# Patient Record
Sex: FEMALE | Race: WHITE | NOT HISPANIC OR LATINO | Employment: FULL TIME | URBAN - METROPOLITAN AREA
[De-identification: names, ages, dates, MRNs, and addresses within clinical notes are randomized per-mention and may not be internally consistent; named-entity substitution may affect disease eponyms.]

---

## 2017-05-10 ENCOUNTER — HISTORICAL (OUTPATIENT)
Dept: LAB | Facility: HOSPITAL | Age: 37
End: 2017-05-10

## 2017-07-25 ENCOUNTER — HISTORICAL (OUTPATIENT)
Dept: INTENSIVE CARE | Facility: HOSPITAL | Age: 37
End: 2017-07-25

## 2019-12-16 ENCOUNTER — HISTORICAL (OUTPATIENT)
Dept: ADMINISTRATIVE | Facility: HOSPITAL | Age: 39
End: 2019-12-16

## 2020-02-03 ENCOUNTER — HISTORICAL (OUTPATIENT)
Dept: ADMINISTRATIVE | Facility: HOSPITAL | Age: 40
End: 2020-02-03

## 2021-08-23 ENCOUNTER — HISTORICAL (OUTPATIENT)
Dept: RADIOLOGY | Facility: HOSPITAL | Age: 41
End: 2021-08-23

## 2021-09-01 ENCOUNTER — HISTORICAL (OUTPATIENT)
Dept: RADIOLOGY | Facility: HOSPITAL | Age: 41
End: 2021-09-01

## 2021-09-15 ENCOUNTER — HISTORICAL (OUTPATIENT)
Dept: ADMINISTRATIVE | Facility: HOSPITAL | Age: 41
End: 2021-09-15

## 2021-09-15 LAB
BASOPHILS # BLD AUTO: 0 X10E3/UL (ref 0–0.2)
BASOPHILS NFR BLD AUTO: 0 %
EOSINOPHIL # BLD AUTO: 0 X10E3/UL (ref 0–0.4)
EOSINOPHIL NFR BLD AUTO: 0 %
ERYTHROCYTE [DISTWIDTH] IN BLOOD BY AUTOMATED COUNT: 15.5 % (ref 11.7–15.4)
HCT VFR BLD AUTO: 31 % (ref 34–46.6)
HGB BLD-MCNC: 9.7 G/DL (ref 11.1–15.9)
LYMPHOCYTES # BLD AUTO: 1.1 X10E3/UL (ref 0.7–3.1)
LYMPHOCYTES NFR BLD AUTO: 9 %
MCH RBC QN AUTO: 27.6 PG (ref 26.6–33)
MCHC RBC AUTO-ENTMCNC: 31.3 G/DL (ref 31.5–35.7)
MCV RBC AUTO: 88 FL (ref 79–97)
MONOCYTES # BLD AUTO: 0.6 X10E3/UL (ref 0.1–0.9)
MONOCYTES NFR BLD AUTO: 5 %
NEUTROPHILS # BLD AUTO: 9.5 X10E3/UL (ref 1.4–7)
NEUTROPHILS NFR BLD AUTO: 86 %
PLATELET # BLD AUTO: 231 X10E3/UL (ref 150–450)
RBC # BLD AUTO: 3.51 X10(6)/MCL (ref 3.77–5.28)
WBC # SPEC AUTO: 11.3 X10E3/UL (ref 3.4–10.8)

## 2021-09-17 ENCOUNTER — HISTORICAL (OUTPATIENT)
Dept: LAB | Facility: HOSPITAL | Age: 41
End: 2021-09-17

## 2021-09-20 ENCOUNTER — HISTORICAL (OUTPATIENT)
Dept: INFUSION THERAPY | Facility: HOSPITAL | Age: 41
End: 2021-09-20

## 2021-09-20 ENCOUNTER — HISTORICAL (OUTPATIENT)
Dept: RADIOLOGY | Facility: HOSPITAL | Age: 41
End: 2021-09-20

## 2021-09-20 LAB
BUN SERPL-MCNC: 13.7 MG/DL (ref 7–18.7)
CALCIUM SERPL-MCNC: 9.3 MG/DL (ref 8.4–10.2)
CHLORIDE SERPL-SCNC: 104 MMOL/L (ref 98–107)
CO2 SERPL-SCNC: 22 MMOL/L (ref 22–29)
CREAT SERPL-MCNC: 0.76 MG/DL (ref 0.55–1.02)
CREAT/UREA NIT SERPL: 18
GLUCOSE SERPL-MCNC: 84 MG/DL (ref 74–100)
POTASSIUM SERPL-SCNC: 3.8 MMOL/L (ref 3.5–5.1)
SODIUM SERPL-SCNC: 141 MMOL/L (ref 136–145)

## 2021-09-29 ENCOUNTER — HISTORICAL (OUTPATIENT)
Dept: RADIOLOGY | Facility: HOSPITAL | Age: 41
End: 2021-09-29

## 2021-10-15 ENCOUNTER — HISTORICAL (OUTPATIENT)
Dept: ADMINISTRATIVE | Facility: HOSPITAL | Age: 41
End: 2021-10-15

## 2021-10-15 LAB
BASOPHILS # BLD AUTO: 0 X10E3/UL (ref 0–0.2)
BASOPHILS NFR BLD AUTO: 0 %
EOSINOPHIL # BLD AUTO: 0.5 X10E3/UL (ref 0–0.4)
EOSINOPHIL NFR BLD AUTO: 6 %
ERYTHROCYTE [DISTWIDTH] IN BLOOD BY AUTOMATED COUNT: 15 % (ref 11.7–15.4)
HCT VFR BLD AUTO: 34.9 % (ref 34–46.6)
HGB BLD-MCNC: 11.7 G/DL (ref 11.1–15.9)
LYMPHOCYTES # BLD AUTO: 2.6 X10E3/UL (ref 0.7–3.1)
LYMPHOCYTES NFR BLD AUTO: 27 %
MCH RBC QN AUTO: 27.9 PG (ref 26.6–33)
MCHC RBC AUTO-ENTMCNC: 33.5 G/DL (ref 31.5–35.7)
MCV RBC AUTO: 83 FL (ref 79–97)
MONOCYTES # BLD AUTO: 0.5 X10E3/UL (ref 0.1–0.9)
MONOCYTES NFR BLD AUTO: 6 %
NEUTROPHILS # BLD AUTO: 5.7 X10E3/UL (ref 1.4–7)
NEUTROPHILS NFR BLD AUTO: 61 %
PLATELET # BLD AUTO: 209 X10E3/UL (ref 150–450)
RBC # BLD AUTO: 4.19 X10(6)/MCL (ref 3.77–5.28)
WBC # SPEC AUTO: 9.4 X10E3/UL (ref 3.4–10.8)

## 2021-10-27 ENCOUNTER — HISTORICAL (OUTPATIENT)
Dept: RADIOLOGY | Facility: HOSPITAL | Age: 41
End: 2021-10-27

## 2021-11-22 ENCOUNTER — HISTORICAL (OUTPATIENT)
Dept: LAB | Facility: HOSPITAL | Age: 41
End: 2021-11-22

## 2021-11-22 LAB
BUN SERPL-MCNC: 11.3 MG/DL (ref 7–18.7)
CALCIUM SERPL-MCNC: 8.4 MG/DL (ref 8.7–10.5)
CHLORIDE SERPL-SCNC: 103 MMOL/L (ref 98–107)
CO2 SERPL-SCNC: 25 MMOL/L (ref 22–29)
CREAT SERPL-MCNC: 0.67 MG/DL (ref 0.55–1.02)
CREAT/UREA NIT SERPL: 17
GLUCOSE SERPL-MCNC: 96 MG/DL (ref 74–100)
POTASSIUM SERPL-SCNC: 3.2 MMOL/L (ref 3.5–5.1)
SODIUM SERPL-SCNC: 142 MMOL/L (ref 136–145)

## 2021-11-26 ENCOUNTER — HISTORICAL (OUTPATIENT)
Dept: RADIOLOGY | Facility: HOSPITAL | Age: 41
End: 2021-11-26

## 2021-12-06 ENCOUNTER — HISTORICAL (OUTPATIENT)
Dept: LAB | Facility: HOSPITAL | Age: 41
End: 2021-12-06

## 2021-12-06 LAB — SARS-COV-2 AG RESP QL IA.RAPID: POSITIVE

## 2022-02-18 ENCOUNTER — HISTORICAL (OUTPATIENT)
Dept: LAB | Facility: HOSPITAL | Age: 42
End: 2022-02-18

## 2022-02-18 LAB
ABS NEUT (OLG): 5.89 (ref 2.1–9.2)
BASOPHILS # BLD AUTO: 0 10*3/UL (ref 0–0.2)
BASOPHILS NFR BLD AUTO: 0 %
BUN SERPL-MCNC: 19.2 MG/DL (ref 7–18.7)
CALCIUM SERPL-MCNC: 9.8 MG/DL (ref 8.7–10.5)
CHLORIDE SERPL-SCNC: 105 MMOL/L (ref 98–107)
CO2 SERPL-SCNC: 25 MMOL/L (ref 22–29)
CREAT SERPL-MCNC: 0.69 MG/DL (ref 0.55–1.02)
CREAT/UREA NIT SERPL: 28
EOSINOPHIL # BLD AUTO: 0.3 10*3/UL (ref 0–0.9)
EOSINOPHIL NFR BLD AUTO: 3 %
ERYTHROCYTE [DISTWIDTH] IN BLOOD BY AUTOMATED COUNT: 14.8 % (ref 11.5–17)
GLUCOSE SERPL-MCNC: 99 MG/DL (ref 74–100)
HCT VFR BLD AUTO: 37.5 % (ref 37–47)
HEMOLYSIS INTERF INDEX SERPL-ACNC: 3
HGB BLD-MCNC: 12 G/DL (ref 12–16)
ICTERIC INTERF INDEX SERPL-ACNC: 0
IMM GRANULOCYTES # BLD AUTO: 0.06 10*3/UL (ref 0–0.02)
IMM GRANULOCYTES NFR BLD AUTO: 0.7 % (ref 0–0.43)
LIPEMIC INTERF INDEX SERPL-ACNC: 2
LYMPHOCYTES # BLD AUTO: 2.4 10*3/UL (ref 0.6–4.6)
LYMPHOCYTES NFR BLD AUTO: 26 %
MANUAL DIFF? (OHS): NO
MCH RBC QN AUTO: 28.3 PG (ref 27–31)
MCHC RBC AUTO-ENTMCNC: 32 G/DL (ref 33–36)
MCV RBC AUTO: 88.4 FL (ref 80–94)
MONOCYTES # BLD AUTO: 0.5 10*3/UL (ref 0.1–1.3)
MONOCYTES NFR BLD AUTO: 6 %
NEUTROPHILS # BLD AUTO: 5.89 10*3/UL (ref 1.4–7.9)
NEUTROPHILS NFR BLD AUTO: 64 %
PLATELET # BLD AUTO: 288 10*3/UL (ref 130–400)
PMV BLD AUTO: 9.2 FL (ref 9.4–12.4)
POTASSIUM SERPL-SCNC: 4 MMOL/L (ref 3.5–5.1)
RBC # BLD AUTO: 4.24 10*6/UL (ref 4.2–5.4)
SODIUM SERPL-SCNC: 142 MMOL/L (ref 136–145)
WBC # SPEC AUTO: 9.2 10*3/UL (ref 4.5–11.5)

## 2022-04-12 ENCOUNTER — HISTORICAL (OUTPATIENT)
Dept: ADMINISTRATIVE | Facility: HOSPITAL | Age: 42
End: 2022-04-12
Payer: MEDICAID

## 2022-04-30 VITALS
OXYGEN SATURATION: 96 % | DIASTOLIC BLOOD PRESSURE: 74 MMHG | SYSTOLIC BLOOD PRESSURE: 109 MMHG | BODY MASS INDEX: 52.62 KG/M2 | WEIGHT: 261 LBS | HEIGHT: 59 IN

## 2022-05-05 NOTE — HISTORICAL [facility]
This is a historical note converted from Consuelo. Formatting and pictures may have been removed.  Please reference Consuelo for original formatting and attached multimedia. Chief Complaint  Est patient here with New Right knee pain x2 weeks. No specefic injury. Xrays today.  History of Present Illness  She is a pleasant 39-year-old?who injured her right knee in January 2020.? She awoken?after a night of partying and had?right knee pain. ?The pain is located laterally along the joint line. ?She notes it worse with activities. ?It somewhat better with rest.? She denies any numbness or tingling. ?She is tried home directed exercising?without relief.  Review of Systems  Comprehensive review of system?was performed with no exceptions other than noted in the history of present illness  Physical Exam  Vitals & Measurements  HR:?97(Peripheral)? BP:?144/92?  HT:?150?cm? WT:?107?kg? BMI:?47.56?  Gen: WN, WD, NAD  Card/Res: NL breathing, +distal pulses  Abdomen: ND  Standing exam  stance: normal alignment, no significant leg-length discrepancy  gait:?Antalgic limp  ?   Knee examination  - General comments: unremarkable appearance  ?   - Tenderness: Lateral joint line  ?   Knee??????????RIGHT??????????LEFT  Skin: ??????????Intact ??????????Intact  ROM:??????????0-100??????????0-130  Effusion:????? + ???????????? Neg  MJL TTP:????? Neg ???????????? Neg  LJL TTP: ?????? +???????????? Neg  Leda:? ?+ ???????????? Neg  Pat crep:?????? Neg ???????????????Neg  Patella TTPs: Neg ???????????????Neg  Patella grind: Neg??????????? ?Neg  Lachman: ?????Neg ????????????????????Neg  Pivot shift: ?????Neg ???????????? Neg  Valgus stress: Neg ???????????????Neg  Varus stress: Neg ???????????????Neg  Posterior drawer: Neg ??????????Neg  ?   N-V ????????????????????intact??????????intact  Hip:?????????????????????????nml?????????? nml  ?   Lower extremity edema:Negative  ?  Assessment/Plan  1.?Lateral meniscus tear?S83.289A  ?Concern for lateral  meniscus tear. ?Get MRI to evaluate. ?I will see her back after for review  Ordered:  Clinic Follow up, *Est. 02/10/20 3:00:00 CST, Order for future visit, Lateral meniscus tear, [facility]  MRI Ext Lower Joint Right W/O Contrast, Routine, 02/03/20 14:02:00 CST, Injury, knee & below, None, Stretcher, Patient Has IV?, Rad Type, Order for future visit, Lateral meniscus tear, Schedule this test, Outside Facility, 02/03/20 14:02:00 CST  Office/Outpatient Visit Level 3 [id number] PC, Lateral meniscus tear, [facility], 02/03/20 14:01:00 CST  ?  Referrals  Clinic Follow up, *Est. 02/10/20 3:00:00 CST, Order for future visit, Lateral meniscus tear, [facility]   Problem List/Past Medical History  Ongoing  Able to lie down  Allergy  Asthma  Chronic infection of sinus  Exercise tolerance  ACE on CPAP  Sleep apnea  Wears glasses  Historical  BENIGN ESSENTIAL HYPERTENSION  Endometriosis  Esophageal reflux (GERD)  History of kidney stones  Obesity  Procedure/Surgical History  Ethmoidectomy (09/24/2015)  FESS/Functional Endoscopic Sinus Surgery (Bilateral) (09/24/2015)  Frontal sinusectomy (09/24/2015)  Nasal/sinus endoscopy, surgical with frontal sinus exploration, with or without removal of tissue from frontal sinus (09/24/2015)  Nasal/sinus endoscopy, surgical; with ethmoidectomy, partial (anterior) (09/24/2015)  Other operations on nasal sinuses (09/24/2015)  Unlisted procedure, accessory sinuses (09/24/2015)  Laparoscopic lysis of peritoneal adhesions (08/08/2012)  Laparoscopic removal of both ovaries and tubes at same operative episode (08/08/2012)  Laparoscopic robotic assisted procedure (08/08/2012)  Laparoscopic total abdominal hysterectomy (08/08/2012)  bialtaeral foot surgery  cholecysectomy  Hx of hysterectomy  rt knee scope  sinus surgery  tonsilectomy   Medications  hydrochlorothiazide 12.5 mg oral tablet, Oral, Daily  Lotensin, 1 tab(s), Oral, Daily  Protonix 40 mg ORAL enteric coated  tablet, 40 mg= 1 tab(s), Oral, BID  Zofran ODT 8 mg oral tablet, disintegrating, 8 mg= 1 tab(s), Oral, q6hr, PRN, 3 refills,? 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  Borderline diabetes.: Father.  Diabetes mellitus type 1.: Mother.  Heart murmur.: Father.  Hypertension.: Mother.  Non Hodgkins lymphoma.: Father.  Stents: Father.  Brother: History is negative  Sister: History is negative  Health Maintenance  Health Maintenance  ???Pending?(in the next year)  ??? ??OverDue  ??? ? ? ?Asthma Management-Spirometry due??08/08/13??and every 1??year(s)  ??? ? ? ?Depression Screening due??12/04/18??and every 1??year(s)  ??? ??Due?  ??? ? ? ?Alcohol Misuse Screening due??01/01/20??and every 1??year(s)  ??? ? ? ?Asthma Management-Asthma Education due??02/03/20??and every 6??month(s)  ??? ? ? ?Asthma Management-Cool Peak Flow due??02/03/20??Variable frequency  ??? ? ? ?Asthma Management-Written Action Plan due??02/03/20??and every 6??month(s)  ??? ? ? ?Tetanus Vaccine due??02/03/20??and every 10??year(s)  ??? ??Due In Future?  ??? ? ? ?Obesity Screening not due until??01/01/21??and every 1??year(s)  ??? ? ? ?Blood Pressure Screening not due until??02/02/21??and every 1??year(s)  ??? ? ? ?Body Mass Index Check not due until??02/02/21??and every 1??year(s)  ???Satisfied?(in the past 1 year)  ??? ??Satisfied?  ??? ? ? ?ADL  Screening on??02/03/20.??Satisfied by Alyson Salgado L.  ??? ? ? ?Alcohol Misuse Screening on??12/16/19.??Satisfied by Alyson Salgado L.  ??? ? ? ?Blood Pressure Screening on??02/03/20.??Satisfied by Alyson Salgado L.  ??? ? ? ?Body Mass Index Check on??02/03/20.??Satisfied by Alyson Salgado L.  ??? ? ? ?Obesity Screening on??02/03/20.??Satisfied by Alyson Salgado LJoel L.  ?  Diagnostic Results  Knee radiographs show no arthritis

## 2022-05-05 NOTE — HISTORICAL [facility]
This is a historical note converted from Consuelo. Formatting and pictures may have been removed.  Please reference Consuelo for original formatting and attached multimedia. Chief Complaint  New patient here with right shoulder pain x2 months, last 2 weeks constant. No injury. C/o radiating pain into bicep area. Xrays today.  History of Present Illness  She is a pleasant 39-year-old whose had right shoulder pain?since October 2019.? The pain is increased since December 2019. ?The pain is located?laterally and somewhat anterior. ?She notes it worse with crossarm activities or reaching behind her. ?It somewhat better at rest. ?She denies any numbness or tingling.? She is tried anti-inflammatory medicines without relief.  Review of Systems  Comprehensive review of system?was performed with no exceptions other than noted in the history of present illness  Physical Exam  Vitals & Measurements  HR:?99(Peripheral)? BP:?140/91?  HT:?150?cm? WT:?107?kg? BMI:?47.56?  Gen: WN, WD, NAD  Card/Res: NL breathing, +distal pulses  Abdomen: ND  Shoulder Exam:??????????Right??????????Left  Skin:??????????????????????????????Normal???????Normal  AC joint tenderness:?????????+?????????None  Forward Flexion:?????????????180 ??????????180  Abduction:?????????????????????180??????????? 180  External Rotation: ????????????? 80??????????????80  Internal Rotation?????????????? 80 ???????????? 80  Supraspinatus stress test?????? Neg??????????Neg  Broderick Impingement:?? ?????+ ?????????? ?Neg  Neer Impingement:?????????????+??????????Neg  Apprehension:???????????????????? Neg??????????Neg  OBriens:????????????????????????????+????????? Neg  Speeds test:??????????????????????? Neg??????????Neg  Strength:  External Rotation:???????????????5/5???????????????5/5  Lift Off/belly press:????????????5/5???????????????5/5  ?   N-V status:?????????????????????????Intact??????????Intact  ?   C-spine: Normal ROM, NT  ?  ?  Assessment/Plan  1.?Shoulder  impingement?M75.40  ?We will try an injection today. ?If her pain persist we will consider?an MRI  ?  Risks of cortisone were discussed with the patient including hypopigmentation subcutaneous fat atrophy, and post injection pain flare. The patient understood these risks and requested to proceed. The right shoulder was prepped with betadine. The 1cc of steroid and 3cc of lidocaine injection was administered under sterile techinique. The injection was administered in clinic by me, Ric Santos, and the patient tolerated the procedure well.  ?  Ordered:  betamethasone, 12 mg, Intra-Articular, Once, first dose 12/16/19 12:00:00 CST, stop date 12/16/19 12:00:00 CST  Lidocaine inj., 2 mL, Intra-Articular, Once, first dose 12/16/19 11:13:00 CST, stop date 12/16/19 11:13:00 CST  Office/Outpatient Visit Level 3 [id number] PC, Shoulder impingement, [facility], 12/16/19 11:13:00 CST  ?  Orders:  asp/inj jnt/bursa, major [id number] PC, 12/16/19 11:13:00 CST, [facility], Routine, 12/16/19 11:13:00 CST  Clinic Follow-up PRN, 12/16/19 11:13:00 CST, Future Order, [facility]  XR Shoulder Right Minimum 2 Views, Routine, 12/16/19 10:35:00 CST, Pain, None, Ambulatory, Patient Has IV?, Rad Type, Right shoulder pain, Not Scheduled, 12/16/19 10:35:00 CST  Referrals  Clinic Follow-up PRN, 12/16/19 11:13:00 CST, Future Order, [facility]   Problem List/Past Medical History  Ongoing  Able to lie down  Allergy  Asthma  Chronic infection of sinus  Exercise tolerance  ACE on CPAP  Sleep apnea  Wears glasses  Historical  BENIGN ESSENTIAL HYPERTENSION  Endometriosis  Esophageal reflux (GERD)  History of kidney stones  Obesity  Procedure/Surgical History  Ethmoidectomy (09/24/2015)  FESS/Functional Endoscopic Sinus Surgery (Bilateral) (09/24/2015)  Frontal sinusectomy (09/24/2015)  Nasal/sinus endoscopy, surgical with frontal sinus exploration, with or without removal of tissue from frontal sinus (09/24/2015)  Nasal/sinus  endoscopy, surgical; with ethmoidectomy, partial (anterior) (09/24/2015)  Other operations on nasal sinuses (09/24/2015)  Unlisted procedure, accessory sinuses (09/24/2015)  Laparoscopic lysis of peritoneal adhesions (08/08/2012)  Laparoscopic removal of both ovaries and tubes at same operative episode (08/08/2012)  Laparoscopic robotic assisted procedure (08/08/2012)  Laparoscopic total abdominal hysterectomy (08/08/2012)  bialtaeral foot surgery  cholecysectomy  Hx of hysterectomy  rt knee scope  sinus surgery  tonsilectomy   Medications  Celestone, 12 mg, Intra-Articular, Once  hydrochlorothiazide 12.5 mg oral tablet, Oral, Daily  lidocaine 2% injectable solution, 2 mL, Intra-Articular, Once  Lotensin, 1 tab(s), Oral, Daily  Protonix 40 mg ORAL enteric coated tablet, 40 mg= 1 tab(s), Oral, BID  Zofran ODT 8 mg oral tablet, disintegrating, 8 mg= 1 tab(s), Oral, q6hr, PRN, 3 refills,? 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  Borderline diabetes.: Father.  Diabetes mellitus type 1.: Mother.  Heart murmur.: Father.  Hypertension.: Mother.  Non Hodgkins lymphoma.: Father.  Stents: Father.  Brother: History is negative  Sister: History is negative  Health Maintenance  Health Maintenance  ???Pending?(in the next year)  ??? ??OverDue  ??? ? ? ?Asthma Management-Spirometry due??08/08/13??and every 1??year(s)  ???  ??Due?  ??? ? ? ?ADL Screening due??12/16/19??and every 1??year(s)  ??? ? ? ?Asthma Management-Asthma Education due??12/16/19??and every 6??month(s)  ??? ? ? ?Asthma Management-Cool Peak Flow due??12/16/19??Variable frequency  ??? ? ? ?Asthma Management-Written Action Plan due??12/16/19??and every 6??month(s)  ??? ? ? ?Tetanus Vaccine due??12/16/19??and every 10??year(s)  ??? ??Due In Future?  ??? ? ? ?Alcohol Misuse Screening not due until??01/01/20??and every 1??year(s)  ??? ? ? ?Obesity Screening not due until??01/01/20??and every 1??year(s)  ???Satisfied?(in the past 1 year)  ??? ??Satisfied?  ??? ? ? ?Alcohol Misuse Screening on??12/16/19.??Satisfied by Alyson Salgado L. L.  ??? ? ? ?Blood Pressure Screening on??12/16/19.??Satisfied by Alyson Salgado L. L.  ??? ? ? ?Body Mass Index Check on??12/16/19.??Satisfied by Alyson Salgado L. L.  ??? ? ? ?Obesity Screening on??12/16/19.??Satisfied by Alyson Salgado L. L.  ?  Diagnostic Results  Shoulder radiographs show no arthritis.

## 2022-05-20 ENCOUNTER — CLINICAL SUPPORT (OUTPATIENT)
Dept: FAMILY MEDICINE | Facility: CLINIC | Age: 42
End: 2022-05-20
Payer: MEDICAID

## 2022-05-20 DIAGNOSIS — Z00.00 ANNUAL PHYSICAL EXAM: Primary | ICD-10-CM

## 2022-05-20 DIAGNOSIS — D50.9 IRON DEFICIENCY ANEMIA, UNSPECIFIED IRON DEFICIENCY ANEMIA TYPE: ICD-10-CM

## 2022-05-20 PROBLEM — I10 HYPERTENSION: Status: ACTIVE | Noted: 2022-05-20

## 2022-05-20 PROBLEM — J45.909 ASTHMA: Status: ACTIVE | Noted: 2022-05-20

## 2022-05-20 PROBLEM — J32.9 CHRONIC SINUSITIS: Status: ACTIVE | Noted: 2022-05-20

## 2022-05-20 PROBLEM — F41.1 GENERALIZED ANXIETY DISORDER: Status: ACTIVE | Noted: 2022-05-20

## 2022-05-20 PROBLEM — T78.40XA ALLERGY: Status: ACTIVE | Noted: 2022-05-20

## 2022-05-20 PROBLEM — F32.9 MAJOR DEPRESSIVE DISORDER: Status: ACTIVE | Noted: 2022-05-20

## 2022-05-20 PROBLEM — R60.0 PERIPHERAL EDEMA: Status: ACTIVE | Noted: 2022-05-20

## 2022-05-20 PROBLEM — G47.33 OBSTRUCTIVE SLEEP APNEA SYNDROME: Status: ACTIVE | Noted: 2022-05-20

## 2022-05-20 PROBLEM — E87.6 HYPOKALEMIA: Status: ACTIVE | Noted: 2022-05-20

## 2022-05-20 LAB
ALBUMIN SERPL-MCNC: 4 GM/DL (ref 3.5–5)
ALBUMIN/GLOB SERPL: 1 RATIO (ref 1.1–2)
ALP SERPL-CCNC: 130 UNIT/L (ref 40–150)
ALT SERPL-CCNC: 18 UNIT/L (ref 0–55)
AST SERPL-CCNC: 21 UNIT/L (ref 5–34)
BASOPHILS # BLD AUTO: 0.04 X10(3)/MCL (ref 0–0.2)
BASOPHILS NFR BLD AUTO: 0.4 %
BILIRUBIN DIRECT+TOT PNL SERPL-MCNC: 0.4 MG/DL
BUN SERPL-MCNC: 14.6 MG/DL (ref 7–18.7)
CALCIUM SERPL-MCNC: 9.8 MG/DL (ref 8.4–10.2)
CHLORIDE SERPL-SCNC: 103 MMOL/L (ref 98–107)
CHOLEST SERPL-MCNC: 165 MG/DL
CHOLEST/HDLC SERPL: 5 {RATIO} (ref 0–5)
CO2 SERPL-SCNC: 24 MMOL/L (ref 22–29)
CREAT SERPL-MCNC: 0.66 MG/DL (ref 0.55–1.02)
EOSINOPHIL # BLD AUTO: 0.16 X10(3)/MCL (ref 0–0.9)
EOSINOPHIL NFR BLD AUTO: 1.8 %
ERYTHROCYTE [DISTWIDTH] IN BLOOD BY AUTOMATED COUNT: 13.3 % (ref 11.5–17)
GLOBULIN SER-MCNC: 3.9 GM/DL (ref 2.4–3.5)
GLUCOSE SERPL-MCNC: 93 MG/DL (ref 74–100)
HCT VFR BLD AUTO: 39.3 % (ref 37–47)
HDLC SERPL-MCNC: 34 MG/DL (ref 35–60)
HGB BLD-MCNC: 12.9 GM/DL (ref 12–16)
IMM GRANULOCYTES # BLD AUTO: 0.04 X10(3)/MCL (ref 0–0.02)
IMM GRANULOCYTES NFR BLD AUTO: 0.4 % (ref 0–0.43)
IRON SATN MFR SERPL: 23 % (ref 20–50)
IRON SERPL-MCNC: 63 UG/DL (ref 50–170)
LDLC SERPL CALC-MCNC: 90 MG/DL (ref 50–140)
LYMPHOCYTES # BLD AUTO: 2.46 X10(3)/MCL (ref 0.6–4.6)
LYMPHOCYTES NFR BLD AUTO: 27.5 %
MCH RBC QN AUTO: 28.9 PG (ref 27–31)
MCHC RBC AUTO-ENTMCNC: 32.8 MG/DL (ref 33–36)
MCV RBC AUTO: 88.1 FL (ref 80–94)
MONOCYTES # BLD AUTO: 0.47 X10(3)/MCL (ref 0.1–1.3)
MONOCYTES NFR BLD AUTO: 5.3 %
NEUTROPHILS # BLD AUTO: 5.8 X10(3)/MCL (ref 2.1–9.2)
NEUTROPHILS NFR BLD AUTO: 64.6 %
PLATELET # BLD AUTO: 308 X10(3)/MCL (ref 130–400)
PMV BLD AUTO: 9.6 FL (ref 9.4–12.4)
POTASSIUM SERPL-SCNC: 4.5 MMOL/L (ref 3.5–5.1)
PROT SERPL-MCNC: 7.9 GM/DL (ref 6.4–8.3)
RBC # BLD AUTO: 4.46 X10(6)/MCL (ref 4.2–5.4)
SODIUM SERPL-SCNC: 138 MMOL/L (ref 136–145)
TIBC SERPL-MCNC: 212 UG/DL (ref 70–310)
TIBC SERPL-MCNC: 275 UG/DL (ref 250–450)
TRANSFERRIN SERPL-MCNC: 245 MG/DL (ref 180–382)
TRIGL SERPL-MCNC: 204 MG/DL (ref 37–140)
TSH SERPL-ACNC: 0.73 UIU/ML (ref 0.35–4.94)
VLDLC SERPL CALC-MCNC: 41 MG/DL
WBC # SPEC AUTO: 9 X10(3)/MCL (ref 4.5–11.5)

## 2022-05-20 PROCEDURE — 80053 COMPREHEN METABOLIC PANEL: CPT | Performed by: NURSE PRACTITIONER

## 2022-05-20 PROCEDURE — 36415 COLL VENOUS BLD VENIPUNCTURE: CPT | Performed by: NURSE PRACTITIONER

## 2022-05-20 PROCEDURE — 84443 ASSAY THYROID STIM HORMONE: CPT | Performed by: NURSE PRACTITIONER

## 2022-05-20 PROCEDURE — 99499 NO LOS: ICD-10-PCS | Mod: ,,, | Performed by: NURSE PRACTITIONER

## 2022-05-20 PROCEDURE — 83540 ASSAY OF IRON: CPT | Performed by: NURSE PRACTITIONER

## 2022-05-20 PROCEDURE — 85025 COMPLETE CBC W/AUTO DIFF WBC: CPT | Performed by: NURSE PRACTITIONER

## 2022-05-20 PROCEDURE — 80061 LIPID PANEL: CPT | Performed by: NURSE PRACTITIONER

## 2022-05-20 PROCEDURE — 99499 UNLISTED E&M SERVICE: CPT | Mod: ,,, | Performed by: NURSE PRACTITIONER

## 2022-05-20 NOTE — PROGRESS NOTES
Patient was here this morning for a nurse visit for blood draw for annual wellness. Pt tolerated well.

## 2022-05-23 ENCOUNTER — OFFICE VISIT (OUTPATIENT)
Dept: FAMILY MEDICINE | Facility: CLINIC | Age: 42
End: 2022-05-23
Payer: MEDICAID

## 2022-05-23 ENCOUNTER — PATIENT MESSAGE (OUTPATIENT)
Dept: FAMILY MEDICINE | Facility: CLINIC | Age: 42
End: 2022-05-23

## 2022-05-23 VITALS
HEIGHT: 59 IN | DIASTOLIC BLOOD PRESSURE: 52 MMHG | HEART RATE: 84 BPM | WEIGHT: 240.63 LBS | SYSTOLIC BLOOD PRESSURE: 110 MMHG | TEMPERATURE: 98 F | BODY MASS INDEX: 48.51 KG/M2 | RESPIRATION RATE: 16 BRPM | OXYGEN SATURATION: 98 %

## 2022-05-23 DIAGNOSIS — I10 HYPERTENSION, UNSPECIFIED TYPE: ICD-10-CM

## 2022-05-23 DIAGNOSIS — D50.9 IRON DEFICIENCY ANEMIA, UNSPECIFIED IRON DEFICIENCY ANEMIA TYPE: ICD-10-CM

## 2022-05-23 DIAGNOSIS — Z00.00 ANNUAL PHYSICAL EXAM: Primary | ICD-10-CM

## 2022-05-23 DIAGNOSIS — Z12.31 BREAST CANCER SCREENING BY MAMMOGRAM: ICD-10-CM

## 2022-05-23 DIAGNOSIS — F32.9 MAJOR DEPRESSIVE DISORDER, REMISSION STATUS UNSPECIFIED, UNSPECIFIED WHETHER RECURRENT: ICD-10-CM

## 2022-05-23 DIAGNOSIS — E87.6 HYPOKALEMIA: ICD-10-CM

## 2022-05-23 PROCEDURE — 3008F BODY MASS INDEX DOCD: CPT | Mod: CPTII,,, | Performed by: NURSE PRACTITIONER

## 2022-05-23 PROCEDURE — 3074F PR MOST RECENT SYSTOLIC BLOOD PRESSURE < 130 MM HG: ICD-10-PCS | Mod: CPTII,,, | Performed by: NURSE PRACTITIONER

## 2022-05-23 PROCEDURE — 3078F PR MOST RECENT DIASTOLIC BLOOD PRESSURE < 80 MM HG: ICD-10-PCS | Mod: CPTII,,, | Performed by: NURSE PRACTITIONER

## 2022-05-23 PROCEDURE — 4010F ACE/ARB THERAPY RXD/TAKEN: CPT | Mod: CPTII,,, | Performed by: NURSE PRACTITIONER

## 2022-05-23 PROCEDURE — 4010F PR ACE/ARB THEARPY RXD/TAKEN: ICD-10-PCS | Mod: CPTII,,, | Performed by: NURSE PRACTITIONER

## 2022-05-23 PROCEDURE — 3078F DIAST BP <80 MM HG: CPT | Mod: CPTII,,, | Performed by: NURSE PRACTITIONER

## 2022-05-23 PROCEDURE — 1160F PR REVIEW ALL MEDS BY PRESCRIBER/CLIN PHARMACIST DOCUMENTED: ICD-10-PCS | Mod: CPTII,,, | Performed by: NURSE PRACTITIONER

## 2022-05-23 PROCEDURE — 3074F SYST BP LT 130 MM HG: CPT | Mod: CPTII,,, | Performed by: NURSE PRACTITIONER

## 2022-05-23 PROCEDURE — 99396 PR PREVENTIVE VISIT,EST,40-64: ICD-10-PCS | Mod: ,,, | Performed by: NURSE PRACTITIONER

## 2022-05-23 PROCEDURE — 1159F PR MEDICATION LIST DOCUMENTED IN MEDICAL RECORD: ICD-10-PCS | Mod: CPTII,,, | Performed by: NURSE PRACTITIONER

## 2022-05-23 PROCEDURE — 1159F MED LIST DOCD IN RCRD: CPT | Mod: CPTII,,, | Performed by: NURSE PRACTITIONER

## 2022-05-23 PROCEDURE — 1160F RVW MEDS BY RX/DR IN RCRD: CPT | Mod: CPTII,,, | Performed by: NURSE PRACTITIONER

## 2022-05-23 PROCEDURE — 3008F PR BODY MASS INDEX (BMI) DOCUMENTED: ICD-10-PCS | Mod: CPTII,,, | Performed by: NURSE PRACTITIONER

## 2022-05-23 PROCEDURE — 99396 PREV VISIT EST AGE 40-64: CPT | Mod: ,,, | Performed by: NURSE PRACTITIONER

## 2022-05-23 RX ORDER — SILVER SULFADIAZINE 10 G/1000G
CREAM TOPICAL
COMMUNITY
Start: 2022-01-10 | End: 2023-05-24 | Stop reason: ALTCHOICE

## 2022-05-23 RX ORDER — PRAVASTATIN SODIUM 40 MG/1
40 TABLET ORAL DAILY
COMMUNITY
Start: 2022-04-25 | End: 2023-11-27 | Stop reason: SDUPTHER

## 2022-05-23 RX ORDER — BENAZEPRIL HYDROCHLORIDE 10 MG/1
10 TABLET ORAL
COMMUNITY
Start: 2021-09-17 | End: 2022-10-17

## 2022-05-23 RX ORDER — POTASSIUM CHLORIDE 750 MG/1
10 CAPSULE, EXTENDED RELEASE ORAL DAILY
COMMUNITY
Start: 2022-05-02 | End: 2022-12-19

## 2022-05-23 RX ORDER — IPRATROPIUM BROMIDE AND ALBUTEROL SULFATE 2.5; .5 MG/3ML; MG/3ML
3 SOLUTION RESPIRATORY (INHALATION)
COMMUNITY
Start: 2021-09-17

## 2022-05-23 RX ORDER — PANTOPRAZOLE SODIUM 40 MG/1
40 TABLET, DELAYED RELEASE ORAL 2 TIMES DAILY
COMMUNITY
Start: 2022-04-25 | End: 2022-11-18

## 2022-05-23 RX ORDER — SPIRONOLACTONE 50 MG/1
50 TABLET, FILM COATED ORAL DAILY
COMMUNITY
Start: 2022-05-02 | End: 2022-11-15 | Stop reason: SDUPTHER

## 2022-05-23 RX ORDER — VILAZODONE HYDROCHLORIDE 20 MG/1
1 TABLET ORAL DAILY
COMMUNITY
Start: 2022-01-24 | End: 2022-09-26 | Stop reason: SDUPTHER

## 2022-05-23 RX ORDER — NIFEDIPINE 60 MG/1
60 TABLET, EXTENDED RELEASE ORAL
COMMUNITY
Start: 2021-11-23 | End: 2022-09-26 | Stop reason: SDUPTHER

## 2022-05-23 RX ORDER — MONTELUKAST SODIUM 10 MG/1
10 TABLET ORAL
COMMUNITY
Start: 2021-10-15 | End: 2022-11-04

## 2022-05-23 NOTE — PROGRESS NOTES
Subjective:       Patient ID: Malia Schroeder is a 42 y.o. female.    Chief Complaint: Annual Exam      HPI   This is a 42-year-old white female who presents to the clinic today for an annual wellness exam.  Patient has a history of anxiety, depression, chronic sinusitis, asthma, hypertension, hypokalemia, anemia, allergies, obstructive sleep apnea, peripheral edema.  Patient states she is doing well the medications and denies any side effects.  No complaints today.        Review of Systems   Constitutional: Negative.    HENT: Negative.    Eyes: Negative.    Respiratory: Negative.    Cardiovascular: Negative.    Gastrointestinal: Negative.    Endocrine: Negative.    Genitourinary: Negative.    Musculoskeletal: Negative.    Integumentary:  Negative.   Allergic/Immunologic: Negative.    Neurological: Negative.    Hematological: Negative.    Psychiatric/Behavioral: Negative.    All other systems reviewed and are negative.          The patient's Health Maintenance was reviewed and the following appears to be due:   Health Maintenance Due   Topic Date Due    Hepatitis C Screening  Never done    Pneumococcal Vaccines (Age 0-64) (1 - PCV) Never done    HIV Screening  Never done    TETANUS VACCINE  Never done    Mammogram  Never done    COVID-19 Vaccine (3 - Booster for Moderna series) 10/24/2021       Past Medical History:  Past Medical History:   Diagnosis Date    Allergies     Anxiety disorder, unspecified     Chronic sinusitis, unspecified     Depression     GERD (gastroesophageal reflux disease)     Hyperlipidemia     Hypertension     Hypokalemia     Iron deficiency anemia, unspecified     Mild intermittent asthma, uncomplicated     Mild intermittent asthma, uncomplicated     Obstructive sleep apnea     Peripheral edema      Past Surgical History:   Procedure Laterality Date    CHOLECYSTECTOMY      HYSTERECTOMY      PLANTAR FASCIA RELEASE      SINUS SURGERY      TARSAL TUNNEL RELEASE       "TONSILLECTOMY       Review of patient's allergies indicates:   Allergen Reactions    Hydrocodone-acetaminophen Hives     Current Outpatient Medications on File Prior to Visit   Medication Sig Dispense Refill    albuterol-ipratropium (DUO-NEB) 2.5 mg-0.5 mg/3 mL nebulizer solution Inhale 3 mLs into the lungs.      benazepriL (LOTENSIN) 10 MG tablet Take 10 mg by mouth.      montelukast (SINGULAIR) 10 mg tablet Take 10 mg by mouth.      NIFEdipine (PROCARDIA-XL) 60 MG (OSM) 24 hr tablet Take 60 mg by mouth.      potassium chloride (MICRO-K) 10 MEQ CpSR Take 10 mEq by mouth once daily.      pravastatin (PRAVACHOL) 40 MG tablet Take 40 mg by mouth once daily.      silver sulfADIAZINE 1% (SILVADENE) 1 % cream Apply topically.      spironolactone (ALDACTONE) 50 MG tablet Take 50 mg by mouth once daily.      VIIBRYD 20 mg Tab Take 1 tablet by mouth once daily.      pantoprazole (PROTONIX) 40 MG tablet Take 40 mg by mouth 2 (two) times daily.       No current facility-administered medications on file prior to visit.     Social History     Socioeconomic History    Marital status: Single   Tobacco Use    Smoking status: Current Some Day Smoker     Types: Cigarettes    Smokeless tobacco: Never Used   Substance and Sexual Activity    Alcohol use: Yes    Drug use: Never     Family History   Problem Relation Age of Onset    Hypertension Mother     Dementia Mother     Cancer Father          Objective:       BP (!) 110/52 (BP Location: Left arm)   Pulse 84   Temp 98.2 °F (36.8 °C) (Oral)   Resp 16   Ht 4' 11" (1.499 m)   Wt 109.1 kg (240 lb 9.6 oz)   SpO2 98%   BMI 48.60 kg/m²      Physical Exam  Vitals and nursing note reviewed.   Constitutional:       Appearance: Normal appearance. She is obese.   HENT:      Head: Normocephalic and atraumatic.      Right Ear: Tympanic membrane, ear canal and external ear normal.      Left Ear: Tympanic membrane, ear canal and external ear normal.      Nose: Nose normal. "      Mouth/Throat:      Mouth: Mucous membranes are moist.      Pharynx: Oropharynx is clear.   Eyes:      Extraocular Movements: Extraocular movements intact.      Conjunctiva/sclera: Conjunctivae normal.      Pupils: Pupils are equal, round, and reactive to light.   Cardiovascular:      Rate and Rhythm: Normal rate and regular rhythm.      Heart sounds: Normal heart sounds.   Pulmonary:      Effort: Pulmonary effort is normal.      Breath sounds: Normal breath sounds.   Abdominal:      General: Abdomen is flat. Bowel sounds are normal.      Palpations: Abdomen is soft.   Musculoskeletal:         General: Normal range of motion.      Cervical back: Normal range of motion and neck supple.   Skin:     General: Skin is warm and dry.   Neurological:      General: No focal deficit present.      Mental Status: She is alert and oriented to person, place, and time.   Psychiatric:         Mood and Affect: Mood normal.         Behavior: Behavior normal.         Thought Content: Thought content normal.         Judgment: Judgment normal.         Labs  Clinical Support on 05/20/2022   Component Date Value Ref Range Status    Sodium Level 05/20/2022 138  136 - 145 mmol/L Final    Potassium Level 05/20/2022 4.5  3.5 - 5.1 mmol/L Final    Chloride 05/20/2022 103  98 - 107 mmol/L Final    Carbon Dioxide 05/20/2022 24  22 - 29 mmol/L Final    Glucose Level 05/20/2022 93  74 - 100 mg/dL Final    Blood Urea Nitrogen 05/20/2022 14.6  7.0 - 18.7 mg/dL Final    Creatinine 05/20/2022 0.66  0.55 - 1.02 mg/dL Final    Calcium Level Total 05/20/2022 9.8  8.4 - 10.2 mg/dL Final    Protein Total 05/20/2022 7.9  6.4 - 8.3 gm/dL Final    Albumin Level 05/20/2022 4.0  3.5 - 5.0 gm/dL Final    Globulin 05/20/2022 3.9 (A) 2.4 - 3.5 gm/dL Final    Albumin/Globulin Ratio 05/20/2022 1.0 (A) 1.1 - 2.0 ratio Final    Bilirubin Total 05/20/2022 0.4  <=1.5 mg/dL Final    Alkaline Phosphatase 05/20/2022 130  40 - 150 unit/L Final    Alanine  Aminotransferase 05/20/2022 18  0 - 55 unit/L Final    Aspartate Aminotransferase 05/20/2022 21  5 - 34 unit/L Final    Cholesterol Total 05/20/2022 165  <=200 mg/dL Final    HDL Cholesterol 05/20/2022 34 (A) 35 - 60 mg/dL Final    Triglyceride 05/20/2022 204 (A) 37 - 140 mg/dL Final    Cholesterol/HDL Ratio 05/20/2022 5  0 - 5 Final    Very Low Density Lipoprotein 05/20/2022 41   Final    LDL Cholesterol 05/20/2022 90.00  50.00 - 140.00 mg/dL Final    Thyroid Stimulating Hormone 05/20/2022 0.7280  0.3500 - 4.9400 uIU/mL Final    Iron Binding Capacity Unsaturated 05/20/2022 212  70 - 310 ug/dL Final    Iron Level 05/20/2022 63  50 - 170 ug/dL Final    Transferrin 05/20/2022 245  180 - 382 mg/dL Final    Iron Binding Capacity Total 05/20/2022 275  250 - 450 ug/dL Final    Iron Saturation 05/20/2022 23  20 - 50 % Final    WBC 05/20/2022 9.0  4.5 - 11.5 x10(3)/mcL Final    RBC 05/20/2022 4.46  4.20 - 5.40 x10(6)/mcL Final    Hgb 05/20/2022 12.9  12.0 - 16.0 gm/dL Final    Hct 05/20/2022 39.3  37.0 - 47.0 % Final    MCV 05/20/2022 88.1  80.0 - 94.0 fL Final    MCH 05/20/2022 28.9  27.0 - 31.0 pg Final    MCHC 05/20/2022 32.8 (A) 33.0 - 36.0 mg/dL Final    RDW 05/20/2022 13.3  11.5 - 17.0 % Final    Platelet 05/20/2022 308  130 - 400 x10(3)/mcL Final    MPV 05/20/2022 9.6  9.4 - 12.4 fL Final    Neut % 05/20/2022 64.6  % Final    Lymph % 05/20/2022 27.5  % Final    Mono % 05/20/2022 5.3  % Final    Eos % 05/20/2022 1.8  % Final    Basophil % 05/20/2022 0.4  % Final    Lymph # 05/20/2022 2.46  0.6 - 4.6 x10(3)/mcL Final    Neut # 05/20/2022 5.8  2.1 - 9.2 x10(3)/mcL Final    Mono # 05/20/2022 0.47  0.1 - 1.3 x10(3)/mcL Final    Eos # 05/20/2022 0.16  0 - 0.9 x10(3)/mcL Final    Baso # 05/20/2022 0.04  0 - 0.2 x10(3)/mcL Final    IG# 05/20/2022 0.04 (A) 0 - 0.0155 x10(3)/mcL Final    IG% 05/20/2022 0.4  0 - 0.43 % Final             Assessment:       Problem List Items Addressed This Visit         Psychiatric    Major depressive disorder       Cardiac/Vascular    Hypertension       Renal/    Hypokalemia       Oncology    Iron deficiency anemia      Other Visit Diagnoses     Annual physical exam    -  Primary    Breast cancer screening by mammogram        Relevant Orders    Mammo Digital Screening Bilat w/ Garcia          Plan:         1. Annual physical exam  Labs reviewed in detail with patient, will repeat in 1 year.    2. Hypertension, unspecified type  Stable, continue current meds, monitor blood pressure closely at home and if remains low will decrease nifedipine.  Follow-up 6 months.    3. Hypokalemia  Potassium stable at 4.5, will decrease potassium to 10 mEq daily.  Recheck 1 year.    4. Iron deficiency anemia, unspecified iron deficiency anemia type  Stable, CBC and iron indices normal.    5. Major depressive disorder, remission status unspecified, unspecified whether recurrent  Stable, continue Viibryd.    6. Breast cancer screening by mammogram  Mammogram due, patient encouraged to schedule.  - Mammo Digital Screening Bilat w/ [facility]; Future

## 2022-06-29 ENCOUNTER — TELEPHONE (OUTPATIENT)
Dept: FAMILY MEDICINE | Facility: CLINIC | Age: 42
End: 2022-06-29
Payer: MEDICAID

## 2022-06-29 RX ORDER — ALPRAZOLAM 0.5 MG/1
0.5 TABLET ORAL 2 TIMES DAILY PRN
Qty: 60 TABLET | Refills: 2 | Status: SHIPPED | OUTPATIENT
Start: 2022-06-29 | End: 2023-01-23 | Stop reason: SDUPTHER

## 2022-07-22 RX ORDER — SULFAMETHOXAZOLE AND TRIMETHOPRIM 800; 160 MG/1; MG/1
1 TABLET ORAL 2 TIMES DAILY
Qty: 20 TABLET | Refills: 0 | Status: SHIPPED | OUTPATIENT
Start: 2022-07-22 | End: 2022-09-26 | Stop reason: SDUPTHER

## 2022-07-22 RX ORDER — MUPIROCIN 20 MG/G
OINTMENT TOPICAL 3 TIMES DAILY
Qty: 22 G | Refills: 0 | Status: SHIPPED | OUTPATIENT
Start: 2022-07-22 | End: 2023-05-24 | Stop reason: ALTCHOICE

## 2022-08-09 ENCOUNTER — TELEPHONE (OUTPATIENT)
Dept: FAMILY MEDICINE | Facility: CLINIC | Age: 42
End: 2022-08-09
Payer: MEDICAID

## 2022-08-09 DIAGNOSIS — L25.9 CONTACT DERMATITIS, UNSPECIFIED CONTACT DERMATITIS TYPE, UNSPECIFIED TRIGGER: Primary | ICD-10-CM

## 2022-08-09 RX ORDER — METHYLPREDNISOLONE 4 MG/1
TABLET ORAL
Qty: 1 EACH | Refills: 0 | Status: SHIPPED | OUTPATIENT
Start: 2022-08-09 | End: 2022-09-26 | Stop reason: SDUPTHER

## 2022-08-09 RX ORDER — TRIAMCINOLONE ACETONIDE 1 MG/G
CREAM TOPICAL 2 TIMES DAILY
Qty: 80 G | Refills: 1 | Status: SHIPPED | OUTPATIENT
Start: 2022-08-09 | End: 2023-05-24 | Stop reason: ALTCHOICE

## 2022-08-09 NOTE — TELEPHONE ENCOUNTER
Patient call, has poison ivy, instructed to take Benadryl over-the-counter, Medrol Dosepak and triamcinolone sent to pharmacy.

## 2022-09-21 RX ORDER — ONDANSETRON 4 MG/1
4 TABLET, ORALLY DISINTEGRATING ORAL EVERY 6 HOURS PRN
Qty: 30 TABLET | Refills: 3 | Status: SHIPPED | OUTPATIENT
Start: 2022-09-21 | End: 2023-11-20 | Stop reason: SDUPTHER

## 2022-09-26 ENCOUNTER — OFFICE VISIT (OUTPATIENT)
Dept: FAMILY MEDICINE | Facility: CLINIC | Age: 42
End: 2022-09-26
Payer: MEDICAID

## 2022-09-26 VITALS
TEMPERATURE: 99 F | BODY MASS INDEX: 50.4 KG/M2 | SYSTOLIC BLOOD PRESSURE: 136 MMHG | DIASTOLIC BLOOD PRESSURE: 88 MMHG | HEIGHT: 59 IN | RESPIRATION RATE: 19 BRPM | WEIGHT: 250 LBS | OXYGEN SATURATION: 96 % | HEART RATE: 67 BPM

## 2022-09-26 DIAGNOSIS — J32.0 CHRONIC MAXILLARY SINUSITIS: ICD-10-CM

## 2022-09-26 DIAGNOSIS — J45.901 EXACERBATION OF ASTHMA, UNSPECIFIED ASTHMA SEVERITY, UNSPECIFIED WHETHER PERSISTENT: Primary | ICD-10-CM

## 2022-09-26 PROCEDURE — 1159F PR MEDICATION LIST DOCUMENTED IN MEDICAL RECORD: ICD-10-PCS | Mod: CPTII,,, | Performed by: NURSE PRACTITIONER

## 2022-09-26 PROCEDURE — 3008F PR BODY MASS INDEX (BMI) DOCUMENTED: ICD-10-PCS | Mod: CPTII,,, | Performed by: NURSE PRACTITIONER

## 2022-09-26 PROCEDURE — 1160F RVW MEDS BY RX/DR IN RCRD: CPT | Mod: CPTII,,, | Performed by: NURSE PRACTITIONER

## 2022-09-26 PROCEDURE — 4010F PR ACE/ARB THEARPY RXD/TAKEN: ICD-10-PCS | Mod: CPTII,,, | Performed by: NURSE PRACTITIONER

## 2022-09-26 PROCEDURE — 99213 PR OFFICE/OUTPT VISIT, EST, LEVL III, 20-29 MIN: ICD-10-PCS | Mod: ,,, | Performed by: NURSE PRACTITIONER

## 2022-09-26 PROCEDURE — 3008F BODY MASS INDEX DOCD: CPT | Mod: CPTII,,, | Performed by: NURSE PRACTITIONER

## 2022-09-26 PROCEDURE — 3079F PR MOST RECENT DIASTOLIC BLOOD PRESSURE 80-89 MM HG: ICD-10-PCS | Mod: CPTII,,, | Performed by: NURSE PRACTITIONER

## 2022-09-26 PROCEDURE — 3075F SYST BP GE 130 - 139MM HG: CPT | Mod: CPTII,,, | Performed by: NURSE PRACTITIONER

## 2022-09-26 PROCEDURE — 1159F MED LIST DOCD IN RCRD: CPT | Mod: CPTII,,, | Performed by: NURSE PRACTITIONER

## 2022-09-26 PROCEDURE — 4010F ACE/ARB THERAPY RXD/TAKEN: CPT | Mod: CPTII,,, | Performed by: NURSE PRACTITIONER

## 2022-09-26 PROCEDURE — 99213 OFFICE O/P EST LOW 20 MIN: CPT | Mod: ,,, | Performed by: NURSE PRACTITIONER

## 2022-09-26 PROCEDURE — 3079F DIAST BP 80-89 MM HG: CPT | Mod: CPTII,,, | Performed by: NURSE PRACTITIONER

## 2022-09-26 PROCEDURE — 3075F PR MOST RECENT SYSTOLIC BLOOD PRESS GE 130-139MM HG: ICD-10-PCS | Mod: CPTII,,, | Performed by: NURSE PRACTITIONER

## 2022-09-26 PROCEDURE — 1160F PR REVIEW ALL MEDS BY PRESCRIBER/CLIN PHARMACIST DOCUMENTED: ICD-10-PCS | Mod: CPTII,,, | Performed by: NURSE PRACTITIONER

## 2022-09-26 RX ORDER — NIFEDIPINE 60 MG/1
TABLET, EXTENDED RELEASE ORAL
COMMUNITY
Start: 2022-08-08 | End: 2023-02-13 | Stop reason: SDUPTHER

## 2022-09-26 RX ORDER — PREDNISONE 20 MG/1
20 TABLET ORAL 2 TIMES DAILY
Qty: 10 TABLET | Refills: 0 | Status: SHIPPED | OUTPATIENT
Start: 2022-09-26 | End: 2022-10-01

## 2022-09-26 RX ORDER — PROMETHAZINE HYDROCHLORIDE AND DEXTROMETHORPHAN HYDROBROMIDE 6.25; 15 MG/5ML; MG/5ML
5 SYRUP ORAL EVERY 4 HOURS PRN
Qty: 200 ML | Refills: 0 | Status: SHIPPED | OUTPATIENT
Start: 2022-09-26 | End: 2022-10-06

## 2022-09-26 RX ORDER — LEVOFLOXACIN 750 MG/1
750 TABLET ORAL DAILY
Qty: 7 TABLET | Refills: 0 | Status: SHIPPED | OUTPATIENT
Start: 2022-09-26 | End: 2022-11-29

## 2022-09-26 RX ORDER — DEXAMETHASONE SODIUM PHOSPHATE 100 MG/10ML
10 INJECTION INTRAMUSCULAR; INTRAVENOUS
Status: COMPLETED | OUTPATIENT
Start: 2022-09-26 | End: 2022-09-26

## 2022-09-26 RX ORDER — VILAZODONE HYDROCHLORIDE 40 MG/1
40 TABLET ORAL DAILY PRN
COMMUNITY
Start: 2022-09-12 | End: 2022-11-14 | Stop reason: SDUPTHER

## 2022-09-26 RX ADMIN — DEXAMETHASONE SODIUM PHOSPHATE 10 MG: 100 INJECTION INTRAMUSCULAR; INTRAVENOUS at 11:09

## 2022-09-26 NOTE — PROGRESS NOTES
Subjective:       Patient ID: Malia Schroeder is a 42 y.o. female.    Chief Complaint: Cough (Since Saturday), Nasal Congestion (X2 weeks), sinus drainage, and Headache      HPI   This is a 42-year-old white female who presents to clinic today with complaint of nasal congestion, sinus pain and drainage, and headache that started 2 weeks ago.  States Saturday started with a cough and fatigue as well.  Denies any fever, denies any sick contacts.      Comprehensive review of systems negative except as stated in HPI    The patient's Health Maintenance was reviewed and the following appears to be due:   Health Maintenance Due   Topic Date Due    Hepatitis C Screening  Never done    Pneumococcal Vaccines (Age 0-64) (1 - PCV) Never done    HIV Screening  Never done    TETANUS VACCINE  Never done    Mammogram  Never done    COVID-19 Vaccine (4 - Booster for Moderna series) 08/15/2022    Influenza Vaccine (1) 09/01/2022       Past Medical History:  Past Medical History:   Diagnosis Date    Allergies     Anxiety disorder, unspecified     Chronic sinusitis, unspecified     Depression     GERD (gastroesophageal reflux disease)     Hyperlipidemia     Hypertension     Hypokalemia     Iron deficiency anemia, unspecified     Mild intermittent asthma, uncomplicated     Mild intermittent asthma, uncomplicated     Obstructive sleep apnea     Peripheral edema      Past Surgical History:   Procedure Laterality Date    CHOLECYSTECTOMY      HYSTERECTOMY      PLANTAR FASCIA RELEASE      SINUS SURGERY      TARSAL TUNNEL RELEASE      TONSILLECTOMY       Review of patient's allergies indicates:   Allergen Reactions    Hydrocodone-acetaminophen Hives     Current Outpatient Medications on File Prior to Visit   Medication Sig Dispense Refill    benazepriL (LOTENSIN) 10 MG tablet Take 10 mg by mouth.      montelukast (SINGULAIR) 10 mg tablet Take 10 mg by mouth.      mupirocin (BACTROBAN) 2 % ointment Apply topically 3 (three) times daily. 22  g 0    NIFEdipine (ADALAT CC) 60 MG TbSR Take by mouth.      ondansetron (ZOFRAN-ODT) 4 MG TbDL Take 1 tablet (4 mg total) by mouth every 6 (six) hours as needed (nausea). 30 tablet 3    pantoprazole (PROTONIX) 40 MG tablet Take 40 mg by mouth 2 (two) times daily.      potassium chloride (MICRO-K) 10 MEQ CpSR Take 10 mEq by mouth once daily.      pravastatin (PRAVACHOL) 40 MG tablet Take 40 mg by mouth once daily.      silver sulfADIAZINE 1% (SILVADENE) 1 % cream Apply topically.      spironolactone (ALDACTONE) 50 MG tablet Take 50 mg by mouth once daily.      triamcinolone acetonide 0.1% (KENALOG) 0.1 % cream Apply topically 2 (two) times daily. 80 g 1    vilazodone (VIIBRYD) 40 mg Tab tablet Take 40 mg by mouth daily as needed.      albuterol-ipratropium (DUO-NEB) 2.5 mg-0.5 mg/3 mL nebulizer solution Inhale 3 mLs into the lungs.      ALPRAZolam (XANAX) 0.5 MG tablet Take 1 tablet (0.5 mg total) by mouth 2 (two) times daily as needed for Anxiety. 60 tablet 2    [DISCONTINUED] methylPREDNISolone (MEDROL DOSEPACK) 4 mg tablet use as directed 1 each 0    [DISCONTINUED] NIFEdipine (PROCARDIA-XL) 60 MG (OSM) 24 hr tablet Take 60 mg by mouth.      [DISCONTINUED] sulfamethoxazole-trimethoprim 800-160mg (BACTRIM DS) 800-160 mg Tab Take 1 tablet by mouth 2 (two) times daily. 20 tablet 0    [DISCONTINUED] VIIBRYD 20 mg Tab Take 1 tablet by mouth once daily.       No current facility-administered medications on file prior to visit.     Social History     Socioeconomic History    Marital status: Single   Tobacco Use    Smoking status: Some Days     Packs/day: 0.00     Years: 0.00     Pack years: 0.00     Types: Cigarettes    Smokeless tobacco: Never   Substance and Sexual Activity    Alcohol use: Yes     Alcohol/week: 12.0 standard drinks     Types: 12 Cans of beer per week    Drug use: Never     Family History   Problem Relation Age of Onset    Hypertension Mother     Dementia Mother     Arthritis Mother     Cancer Father   "       Non hodgekins lymphoma       Objective:       /88 (BP Location: Left arm)   Pulse 67   Temp 98.9 °F (37.2 °C) (Oral)   Resp 19   Ht 4' 11" (1.499 m)   Wt 113.4 kg (250 lb)   SpO2 96%   BMI 50.49 kg/m²      Physical Exam  Vitals and nursing note reviewed.   Constitutional:       Appearance: Normal appearance.   HENT:      Head: Normocephalic and atraumatic.      Right Ear: Tympanic membrane, ear canal and external ear normal.      Left Ear: Tympanic membrane, ear canal and external ear normal.      Nose: Congestion present.      Right Sinus: Maxillary sinus tenderness present.      Left Sinus: Maxillary sinus tenderness present.      Mouth/Throat:      Mouth: Mucous membranes are moist.      Pharynx: Oropharynx is clear.   Eyes:      Extraocular Movements: Extraocular movements intact.      Conjunctiva/sclera: Conjunctivae normal.      Pupils: Pupils are equal, round, and reactive to light.   Cardiovascular:      Rate and Rhythm: Normal rate and regular rhythm.      Heart sounds: Normal heart sounds.   Pulmonary:      Effort: Pulmonary effort is normal.      Breath sounds: Decreased breath sounds (throughout) present.   Musculoskeletal:         General: Normal range of motion.      Cervical back: Normal range of motion and neck supple.   Skin:     General: Skin is warm and dry.   Neurological:      General: No focal deficit present.      Mental Status: She is alert and oriented to person, place, and time.   Psychiatric:         Mood and Affect: Mood normal.         Behavior: Behavior normal.         Thought Content: Thought content normal.         Judgment: Judgment normal.             Assessment and Plan     1. Exacerbation of asthma, unspecified asthma severity, unspecified whether persistent  -     dexamethasone injection 10 mg  -     promethazine-dextromethorphan (PROMETHAZINE-DM) 6.25-15 mg/5 mL Syrp  -     predniSONE (DELTASONE) 20 MG tablet    2. Chronic maxillary sinusitis  -     " levoFLOXacin (LEVAQUIN) 750 MG tablet     Instructed to use her nebulizer every 4 hours as needed at home for wheezing and chest tightness.  Decadron 10 mg IM in clinic today, start prednisone by mouth tomorrow.  Start Levaquin today.  Return for any worsening.    Follow up if symptoms worsen or fail to improve.

## 2022-10-10 ENCOUNTER — TELEPHONE (OUTPATIENT)
Dept: FAMILY MEDICINE | Facility: CLINIC | Age: 42
End: 2022-10-10
Payer: MEDICAID

## 2022-10-10 RX ORDER — AMOXICILLIN AND CLAVULANATE POTASSIUM 875; 125 MG/1; MG/1
1 TABLET, FILM COATED ORAL EVERY 12 HOURS
Qty: 20 TABLET | Refills: 0 | Status: SHIPPED | OUTPATIENT
Start: 2022-10-10 | End: 2022-11-29

## 2022-10-10 NOTE — TELEPHONE ENCOUNTER
Patient called, completed Levaquin, still with sinus pain and drainage and pressure.  No fever.  Augmentin sent to pharmacy, instructed to take probiotics

## 2022-10-25 RX ORDER — MOXIFLOXACIN 5 MG/ML
1 SOLUTION/ DROPS OPHTHALMIC 3 TIMES DAILY
Qty: 3 ML | Refills: 0 | Status: SHIPPED | OUTPATIENT
Start: 2022-10-25 | End: 2022-11-29

## 2022-10-25 RX ORDER — KETOROLAC TROMETHAMINE 5 MG/ML
1 SOLUTION OPHTHALMIC 3 TIMES DAILY
Qty: 5 ML | Refills: 0 | Status: SHIPPED | OUTPATIENT
Start: 2022-10-25 | End: 2022-11-04

## 2022-11-14 DIAGNOSIS — F32.9 MAJOR DEPRESSIVE DISORDER, REMISSION STATUS UNSPECIFIED, UNSPECIFIED WHETHER RECURRENT: Primary | ICD-10-CM

## 2022-11-14 DIAGNOSIS — I10 HYPERTENSION, UNSPECIFIED TYPE: ICD-10-CM

## 2022-11-14 RX ORDER — VILAZODONE HYDROCHLORIDE 40 MG/1
40 TABLET ORAL DAILY
Qty: 30 TABLET | Refills: 11 | Status: SHIPPED | OUTPATIENT
Start: 2022-11-14 | End: 2023-10-09

## 2022-11-15 RX ORDER — SPIRONOLACTONE 50 MG/1
50 TABLET, FILM COATED ORAL DAILY
Qty: 30 TABLET | Refills: 11 | Status: SHIPPED | OUTPATIENT
Start: 2022-11-15 | End: 2023-11-13

## 2022-11-18 ENCOUNTER — TELEPHONE (OUTPATIENT)
Dept: FAMILY MEDICINE | Facility: CLINIC | Age: 42
End: 2022-11-18
Payer: MEDICAID

## 2022-11-18 NOTE — TELEPHONE ENCOUNTER
Are there any outstanding tasks in patient's chart?  n    2. Do we have outstanding/pending referrals?  n    3. Has the patient been seen in an ER, Urgent Care, or admitted since last visit?  n    4. Has patient seen any other health care providers since last visit?  n    5.  Has patient had any blood work or x-rays done since last visit?   n

## 2022-11-29 ENCOUNTER — OFFICE VISIT (OUTPATIENT)
Dept: FAMILY MEDICINE | Facility: CLINIC | Age: 42
End: 2022-11-29
Payer: MEDICAID

## 2022-11-29 VITALS
BODY MASS INDEX: 50.12 KG/M2 | RESPIRATION RATE: 16 BRPM | OXYGEN SATURATION: 98 % | DIASTOLIC BLOOD PRESSURE: 66 MMHG | HEIGHT: 59 IN | HEART RATE: 96 BPM | WEIGHT: 248.63 LBS | SYSTOLIC BLOOD PRESSURE: 110 MMHG | TEMPERATURE: 99 F

## 2022-11-29 DIAGNOSIS — J02.9 PHARYNGITIS, UNSPECIFIED ETIOLOGY: ICD-10-CM

## 2022-11-29 DIAGNOSIS — Z11.4 SCREENING FOR HIV (HUMAN IMMUNODEFICIENCY VIRUS): ICD-10-CM

## 2022-11-29 DIAGNOSIS — Z12.31 BREAST CANCER SCREENING BY MAMMOGRAM: ICD-10-CM

## 2022-11-29 DIAGNOSIS — G47.33 OBSTRUCTIVE SLEEP APNEA SYNDROME: ICD-10-CM

## 2022-11-29 DIAGNOSIS — F41.1 GENERALIZED ANXIETY DISORDER: ICD-10-CM

## 2022-11-29 DIAGNOSIS — I10 HYPERTENSION, UNSPECIFIED TYPE: Primary | ICD-10-CM

## 2022-11-29 DIAGNOSIS — F32.9 MAJOR DEPRESSIVE DISORDER, REMISSION STATUS UNSPECIFIED, UNSPECIFIED WHETHER RECURRENT: ICD-10-CM

## 2022-11-29 DIAGNOSIS — Z13.1 SCREENING FOR DIABETES MELLITUS: ICD-10-CM

## 2022-11-29 DIAGNOSIS — Z11.59 NEED FOR HEPATITIS C SCREENING TEST: ICD-10-CM

## 2022-11-29 DIAGNOSIS — Z00.00 ANNUAL PHYSICAL EXAM: ICD-10-CM

## 2022-11-29 DIAGNOSIS — B07.9 WART OF HAND: ICD-10-CM

## 2022-11-29 PROBLEM — D50.9 IRON DEFICIENCY ANEMIA: Status: RESOLVED | Noted: 2022-05-20 | Resolved: 2022-11-29

## 2022-11-29 LAB
CTP QC/QA: YES
S PYO RRNA THROAT QL PROBE: NEGATIVE

## 2022-11-29 PROCEDURE — 1160F RVW MEDS BY RX/DR IN RCRD: CPT | Mod: CPTII,,, | Performed by: NURSE PRACTITIONER

## 2022-11-29 PROCEDURE — 3074F SYST BP LT 130 MM HG: CPT | Mod: CPTII,,, | Performed by: NURSE PRACTITIONER

## 2022-11-29 PROCEDURE — 4010F ACE/ARB THERAPY RXD/TAKEN: CPT | Mod: CPTII,,, | Performed by: NURSE PRACTITIONER

## 2022-11-29 PROCEDURE — 1160F PR REVIEW ALL MEDS BY PRESCRIBER/CLIN PHARMACIST DOCUMENTED: ICD-10-PCS | Mod: CPTII,,, | Performed by: NURSE PRACTITIONER

## 2022-11-29 PROCEDURE — 17110 PR DESTRUCTION BENIGN LESIONS UP TO 14: ICD-10-PCS | Mod: ,,, | Performed by: NURSE PRACTITIONER

## 2022-11-29 PROCEDURE — 17110 DESTRUCTION B9 LES UP TO 14: CPT | Mod: ,,, | Performed by: NURSE PRACTITIONER

## 2022-11-29 PROCEDURE — 3074F PR MOST RECENT SYSTOLIC BLOOD PRESSURE < 130 MM HG: ICD-10-PCS | Mod: CPTII,,, | Performed by: NURSE PRACTITIONER

## 2022-11-29 PROCEDURE — 87880 POCT RAPID STREP A: ICD-10-PCS | Mod: QW,,, | Performed by: NURSE PRACTITIONER

## 2022-11-29 PROCEDURE — 1159F MED LIST DOCD IN RCRD: CPT | Mod: CPTII,,, | Performed by: NURSE PRACTITIONER

## 2022-11-29 PROCEDURE — 3008F BODY MASS INDEX DOCD: CPT | Mod: CPTII,,, | Performed by: NURSE PRACTITIONER

## 2022-11-29 PROCEDURE — 87880 STREP A ASSAY W/OPTIC: CPT | Mod: QW,,, | Performed by: NURSE PRACTITIONER

## 2022-11-29 PROCEDURE — 3008F PR BODY MASS INDEX (BMI) DOCUMENTED: ICD-10-PCS | Mod: CPTII,,, | Performed by: NURSE PRACTITIONER

## 2022-11-29 PROCEDURE — 4010F PR ACE/ARB THEARPY RXD/TAKEN: ICD-10-PCS | Mod: CPTII,,, | Performed by: NURSE PRACTITIONER

## 2022-11-29 PROCEDURE — 3078F DIAST BP <80 MM HG: CPT | Mod: CPTII,,, | Performed by: NURSE PRACTITIONER

## 2022-11-29 PROCEDURE — 99214 PR OFFICE/OUTPT VISIT, EST, LEVL IV, 30-39 MIN: ICD-10-PCS | Mod: 25,,, | Performed by: NURSE PRACTITIONER

## 2022-11-29 PROCEDURE — 1159F PR MEDICATION LIST DOCUMENTED IN MEDICAL RECORD: ICD-10-PCS | Mod: CPTII,,, | Performed by: NURSE PRACTITIONER

## 2022-11-29 PROCEDURE — 3078F PR MOST RECENT DIASTOLIC BLOOD PRESSURE < 80 MM HG: ICD-10-PCS | Mod: CPTII,,, | Performed by: NURSE PRACTITIONER

## 2022-11-29 PROCEDURE — 99214 OFFICE O/P EST MOD 30 MIN: CPT | Mod: 25,,, | Performed by: NURSE PRACTITIONER

## 2022-11-29 RX ORDER — CEFDINIR 300 MG/1
300 CAPSULE ORAL 2 TIMES DAILY
Qty: 20 CAPSULE | Refills: 0 | Status: SHIPPED | OUTPATIENT
Start: 2022-11-29 | End: 2022-12-09

## 2022-11-29 RX ORDER — DEXAMETHASONE SODIUM PHOSPHATE 100 MG/10ML
10 INJECTION INTRAMUSCULAR; INTRAVENOUS
Status: COMPLETED | OUTPATIENT
Start: 2022-11-29 | End: 2022-11-29

## 2022-11-29 RX ADMIN — DEXAMETHASONE SODIUM PHOSPHATE 10 MG: 100 INJECTION INTRAMUSCULAR; INTRAVENOUS at 04:11

## 2022-11-29 NOTE — ASSESSMENT & PLAN NOTE
Reports continued nightly compliance with auto PAP.  Continue AutoPAP therapy.  Follow-up 6 months with wellness.

## 2022-11-29 NOTE — ASSESSMENT & PLAN NOTE
Verbal consent obtained for wart destruction via cryotherpay using Verucca Freeze. Wart to distal dorsal aspect left 3rd digit  - destruction using Verruca Freeze cryotherapy.  Patient tolerated procedure well.  Instructed to return to clinic in 2 weeks if has not completely fallen off and will repeat procedure.

## 2022-11-29 NOTE — PROGRESS NOTES
Subjective:       Patient ID: Malia Schroeder is a 42 y.o. female.    Chief Complaint: Sore Throat (X3 days) and Lesion (To L 3rd digit x few months. )      HPI   This is a 42-year-old white female who presents to clinic today for six-month follow-up.  Patient has a history of anxiety, depression, chronic sinusitis, asthma, hypertension, hypokalemia, sleep apnea, peripheral edema.  Patient states doing well with her medications and denies any side effects.  Does have a complaint today of a wart to her left finger and also a sore throat for the last 4 days.    Review of Systems   Constitutional:  Negative for activity change and unexpected weight change.   HENT:  Negative for hearing loss, rhinorrhea and trouble swallowing.    Eyes:  Negative for discharge and visual disturbance.   Respiratory:  Negative for chest tightness and wheezing.    Cardiovascular:  Negative for chest pain and palpitations.   Gastrointestinal:  Negative for blood in stool, constipation, diarrhea and vomiting.   Endocrine: Negative for polydipsia and polyuria.   Genitourinary:  Negative for difficulty urinating, dysuria, hematuria and menstrual problem.   Musculoskeletal:  Negative for arthralgias, joint swelling and neck pain.   Neurological:  Negative for weakness and headaches.   Psychiatric/Behavioral:  Negative for confusion and dysphoric mood.    Comprehensive review of systems negative except as stated in HPI    The patient's Health Maintenance was reviewed and the following appears to be due:   Health Maintenance Due   Topic Date Due    Hepatitis C Screening  Never done    HIV Screening  Never done    Mammogram  Never done    Hemoglobin A1c (Diabetic Prevention Screening)  Never done       Past Medical History:  Past Medical History:   Diagnosis Date    Allergies     Anxiety disorder, unspecified     Chronic sinusitis, unspecified     Depression     GERD (gastroesophageal reflux disease)     Hyperlipidemia     Hypertension      Hypokalemia     Iron deficiency anemia, unspecified     Mild intermittent asthma, uncomplicated     Mild intermittent asthma, uncomplicated     Obstructive sleep apnea     Peripheral edema      Past Surgical History:   Procedure Laterality Date    CHOLECYSTECTOMY      HYSTERECTOMY      PLANTAR FASCIA RELEASE      SINUS SURGERY      TARSAL TUNNEL RELEASE      TONSILLECTOMY       Review of patient's allergies indicates:   Allergen Reactions    Hydrocodone-acetaminophen Hives    Promethazine Anxiety     Current Outpatient Medications on File Prior to Visit   Medication Sig Dispense Refill    albuterol-ipratropium (DUO-NEB) 2.5 mg-0.5 mg/3 mL nebulizer solution Inhale 3 mLs into the lungs.      benazepriL (LOTENSIN) 10 MG tablet TAKE 1 TABLET BY MOUTH TWICE A DAY FOR BLOOD PRESSURE 60 tablet 5    montelukast (SINGULAIR) 10 mg tablet TAKE ONE TABLET BY MOUTH EVERY DAY FOR ALLERGIES AND ASTHMA 30 tablet 5    mupirocin (BACTROBAN) 2 % ointment Apply topically 3 (three) times daily. 22 g 0    NIFEdipine (ADALAT CC) 60 MG TbSR Take by mouth.      ondansetron (ZOFRAN-ODT) 4 MG TbDL Take 1 tablet (4 mg total) by mouth every 6 (six) hours as needed (nausea). 30 tablet 3    pantoprazole (PROTONIX) 40 MG tablet TAKE 1 TABLET BY MOUTH TWICE DAILY FOR ACID REFLUX. 60 tablet 5    potassium chloride (MICRO-K) 10 MEQ CpSR Take 10 mEq by mouth once daily.      pravastatin (PRAVACHOL) 40 MG tablet Take 40 mg by mouth once daily.      silver sulfADIAZINE 1% (SILVADENE) 1 % cream Apply topically.      spironolactone (ALDACTONE) 50 MG tablet Take 1 tablet (50 mg total) by mouth once daily. 30 tablet 11    triamcinolone acetonide 0.1% (KENALOG) 0.1 % cream Apply topically 2 (two) times daily. 80 g 1    vilazodone (VIIBRYD) 40 mg Tab tablet Take 1 tablet (40 mg total) by mouth once daily. 30 tablet 11    [DISCONTINUED] amoxicillin-clavulanate 875-125mg (AUGMENTIN) 875-125 mg per tablet Take 1 tablet by mouth every 12 (twelve) hours. 20  "tablet 0    [DISCONTINUED] levoFLOXacin (LEVAQUIN) 750 MG tablet Take 1 tablet (750 mg total) by mouth once daily. 7 tablet 0    [DISCONTINUED] moxifloxacin (VIGAMOX) 0.5 % ophthalmic solution Place 1 drop into both eyes 3 (three) times daily. 3 mL 0    ALPRAZolam (XANAX) 0.5 MG tablet Take 1 tablet (0.5 mg total) by mouth 2 (two) times daily as needed for Anxiety. 60 tablet 2     No current facility-administered medications on file prior to visit.     Social History     Socioeconomic History    Marital status: Single   Tobacco Use    Smoking status: Some Days     Packs/day: 0.00     Years: 0.00     Pack years: 0.00     Types: Cigarettes    Smokeless tobacco: Never   Substance and Sexual Activity    Alcohol use: Yes     Alcohol/week: 12.0 standard drinks     Types: 12 Cans of beer per week    Drug use: Never    Sexual activity: Not Currently     Family History   Problem Relation Age of Onset    Hypertension Mother     Dementia Mother     Arthritis Mother     Cancer Father         Non hodgekins lymphoma       Objective:       /66 (BP Location: Left arm)   Pulse 96   Temp 98.9 °F (37.2 °C) (Oral)   Resp 16   Ht 4' 11" (1.499 m)   Wt 112.8 kg (248 lb 9.6 oz)   SpO2 98%   BMI 50.21 kg/m²      Physical Exam  Vitals and nursing note reviewed.   Constitutional:       Appearance: Normal appearance. She is obese.   HENT:      Head: Normocephalic and atraumatic.      Right Ear: Tympanic membrane, ear canal and external ear normal.      Left Ear: Tympanic membrane, ear canal and external ear normal.      Nose: Nose normal.      Mouth/Throat:      Mouth: Mucous membranes are moist.      Pharynx: Oropharynx is clear. Posterior oropharyngeal erythema present.   Eyes:      Extraocular Movements: Extraocular movements intact.      Conjunctiva/sclera: Conjunctivae normal.      Pupils: Pupils are equal, round, and reactive to light.   Cardiovascular:      Rate and Rhythm: Normal rate and regular rhythm.      Heart " sounds: Normal heart sounds.   Pulmonary:      Effort: Pulmonary effort is normal.      Breath sounds: Normal breath sounds.   Musculoskeletal:         General: Normal range of motion.      Cervical back: Normal range of motion and neck supple.   Skin:     General: Skin is warm and dry.      Comments: Dorsal aspect of distal left 3rd finger with raised wart noted.   Neurological:      General: No focal deficit present.      Mental Status: She is alert and oriented to person, place, and time.   Psychiatric:         Mood and Affect: Mood normal.         Behavior: Behavior normal.         Thought Content: Thought content normal.         Judgment: Judgment normal.       Labs  Office Visit on 11/29/2022   Component Date Value Ref Range Status    Rapid Strep A Screen 11/29/2022 Negative  Negative Final     Acceptable 11/29/2022 Yes   Final       Assessment and Plan       ICD-10-CM ICD-9-CM   1. Hypertension, unspecified type  I10 401.9   2. Major depressive disorder, remission status unspecified, unspecified whether recurrent  F32.9 296.20   3. Generalized anxiety disorder  F41.1 300.02   4. Pharyngitis, unspecified etiology  J02.9 462   5. Wart of hand  B07.9 078.10   6. Obstructive sleep apnea syndrome  G47.33 327.23   7. Breast cancer screening by mammogram  Z12.31 V76.12   8. Annual physical exam  Z00.00 V70.0   9. Screening for diabetes mellitus  Z13.1 V77.1   10. Need for hepatitis C screening test  Z11.59 V73.89   11. Screening for HIV (human immunodeficiency virus)  Z11.4 V73.89        1. Hypertension, unspecified type  Overview:  DX by previous provider, initially on benazepril 10 mg daily   09/17/2021 - benazepril 10 mg BID  11/17/2021 - add nifedipine 30 mg daily  11/22/2021 - increase nifedipine to 60 mg daily   12/02/2021 - spironolactone 50 mg daily added per Dr Saldivar      Assessment & Plan:  Stable, continue current meds, follow-up 6 months with wellness.      2. Major depressive disorder,  remission status unspecified, unspecified whether recurrent  Overview:  Effexor - did not work, made her feel worse  Zoloft > 10 years, DC on 10/15/2021  10/15/2021 - DC Zoloft 200 mg daily, start Viibryd 20 mg daily   02/03/2022 - Viibryd 40 mg daily     Assessment & Plan:  Stable, continue Viibryd 40 mg daily, follow-up 6 months with wellness.      3. Generalized anxiety disorder  Overview:  Xanax 0.5 mg BID prn     Effexor - did not work, made her feel worse  Zoloft > 10 years, DC on 10/15/2021  10/15/2021 - DC Zoloft 200 mg daily, start Viibryd 20 mg daily   02/03/2022 - Viibryd 40 mg daily     Assessment & Plan:  Stable, continue Viibryd 40 mg daily and Xanax as needed.  Follow-up 6 months with wellness.      4. Pharyngitis, unspecified etiology  Comments:  Cefdinir 300 mg twice daily times 10 days, Decadron 10 mg IM in clinic today.  Orders:  -     POCT Rapid Strep A  -     dexAMETHasone injection 10 mg  -     cefdinir (OMNICEF) 300 MG capsule; Take 1 capsule (300 mg total) by mouth 2 (two) times daily. for 10 days  Dispense: 20 capsule; Refill: 0    5. Wart of hand  Assessment & Plan:  Verbal consent obtained for wart destruction via cryotherpay using Verucca Freeze. Wart to distal dorsal aspect left 3rd digit  - destruction using Verruca Freeze cryotherapy.  Patient tolerated procedure well.  Instructed to return to clinic in 2 weeks if has not completely fallen off and will repeat procedure.      6. Obstructive sleep apnea syndrome  Overview:  [facility] 07/25/2017- AHI 46  Previously managed by Keiko Rutherford NP  AutoPap 5-20 cm H2O    Assessment & Plan:  Reports continued nightly compliance with auto PAP.  Continue AutoPAP therapy.  Follow-up 6 months with wellness.      7. Breast cancer screening by mammogram  Assessment & Plan:  MMG ordered in May, patient does not want to complete. Encouraged her to think about it.       8. Annual physical exam  -     CBC Auto Differential; Future;  Expected date: 05/29/2023  -     Comprehensive Metabolic Panel; Future; Expected date: 05/29/2023  -     Lipid Panel; Future; Expected date: 05/29/2023  -     TSH; Future; Expected date: 05/29/2023  -     Hemoglobin A1C; Future; Expected date: 05/29/2023    9. Screening for diabetes mellitus  -     Hemoglobin A1C; Future; Expected date: 05/29/2023    10. Need for hepatitis C screening test  -     Hepatitis C Antibody; Future; Expected date: 05/29/2023    11. Screening for HIV (human immunodeficiency virus)  -     HIV 1/2 Ag/Ab (4th Gen); Future; Expected date: 05/29/2023    Other orders  -     Cancel: Destruction, Benign Lesion         Follow up in 6 months (on 5/24/2023) for Annual.

## 2022-12-13 ENCOUNTER — OFFICE VISIT (OUTPATIENT)
Dept: FAMILY MEDICINE | Facility: CLINIC | Age: 42
End: 2022-12-13
Payer: MEDICAID

## 2022-12-13 VITALS
RESPIRATION RATE: 16 BRPM | SYSTOLIC BLOOD PRESSURE: 106 MMHG | HEIGHT: 59 IN | TEMPERATURE: 98 F | BODY MASS INDEX: 50 KG/M2 | OXYGEN SATURATION: 97 % | HEART RATE: 67 BPM | DIASTOLIC BLOOD PRESSURE: 64 MMHG | WEIGHT: 248 LBS

## 2022-12-13 DIAGNOSIS — B07.9 WART OF HAND: Primary | ICD-10-CM

## 2022-12-13 PROCEDURE — 3078F DIAST BP <80 MM HG: CPT | Mod: CPTII,,, | Performed by: NURSE PRACTITIONER

## 2022-12-13 PROCEDURE — 99212 PR OFFICE/OUTPT VISIT, EST, LEVL II, 10-19 MIN: ICD-10-PCS | Mod: 25,,, | Performed by: NURSE PRACTITIONER

## 2022-12-13 PROCEDURE — 1159F MED LIST DOCD IN RCRD: CPT | Mod: CPTII,,, | Performed by: NURSE PRACTITIONER

## 2022-12-13 PROCEDURE — 99212 OFFICE O/P EST SF 10 MIN: CPT | Mod: 25,,, | Performed by: NURSE PRACTITIONER

## 2022-12-13 PROCEDURE — 4010F ACE/ARB THERAPY RXD/TAKEN: CPT | Mod: CPTII,,, | Performed by: NURSE PRACTITIONER

## 2022-12-13 PROCEDURE — 1160F RVW MEDS BY RX/DR IN RCRD: CPT | Mod: CPTII,,, | Performed by: NURSE PRACTITIONER

## 2022-12-13 PROCEDURE — 3008F BODY MASS INDEX DOCD: CPT | Mod: CPTII,,, | Performed by: NURSE PRACTITIONER

## 2022-12-13 PROCEDURE — 3008F PR BODY MASS INDEX (BMI) DOCUMENTED: ICD-10-PCS | Mod: CPTII,,, | Performed by: NURSE PRACTITIONER

## 2022-12-13 PROCEDURE — 3078F PR MOST RECENT DIASTOLIC BLOOD PRESSURE < 80 MM HG: ICD-10-PCS | Mod: CPTII,,, | Performed by: NURSE PRACTITIONER

## 2022-12-13 PROCEDURE — 3074F SYST BP LT 130 MM HG: CPT | Mod: CPTII,,, | Performed by: NURSE PRACTITIONER

## 2022-12-13 PROCEDURE — 1159F PR MEDICATION LIST DOCUMENTED IN MEDICAL RECORD: ICD-10-PCS | Mod: CPTII,,, | Performed by: NURSE PRACTITIONER

## 2022-12-13 PROCEDURE — 3074F PR MOST RECENT SYSTOLIC BLOOD PRESSURE < 130 MM HG: ICD-10-PCS | Mod: CPTII,,, | Performed by: NURSE PRACTITIONER

## 2022-12-13 PROCEDURE — 17110 DESTRUCTION B9 LES UP TO 14: CPT | Mod: ,,, | Performed by: NURSE PRACTITIONER

## 2022-12-13 PROCEDURE — 17110 PR DESTRUCTION BENIGN LESIONS UP TO 14: ICD-10-PCS | Mod: ,,, | Performed by: NURSE PRACTITIONER

## 2022-12-13 PROCEDURE — 1160F PR REVIEW ALL MEDS BY PRESCRIBER/CLIN PHARMACIST DOCUMENTED: ICD-10-PCS | Mod: CPTII,,, | Performed by: NURSE PRACTITIONER

## 2022-12-13 PROCEDURE — 4010F PR ACE/ARB THEARPY RXD/TAKEN: ICD-10-PCS | Mod: CPTII,,, | Performed by: NURSE PRACTITIONER

## 2022-12-13 NOTE — PROGRESS NOTES
Subjective:       Patient ID: Malia Schroeder is a 42 y.o. female.    Chief Complaint: Warts (Wart to 3rd digit of L hand)        HPI   Patient with complaint today of work to her left 3rd finger.  States that it did fall off some after I froze it a couple of weeks ago but did not completely come off.    Review of Systems   Constitutional:  Negative for activity change and unexpected weight change.   HENT:  Negative for hearing loss, rhinorrhea and trouble swallowing.    Eyes:  Negative for discharge and visual disturbance.   Respiratory:  Negative for chest tightness and wheezing.    Cardiovascular:  Negative for chest pain and palpitations.   Gastrointestinal:  Negative for blood in stool, constipation, diarrhea and vomiting.   Endocrine: Negative for polydipsia and polyuria.   Genitourinary:  Negative for difficulty urinating, dysuria, hematuria and menstrual problem.   Musculoskeletal:  Negative for arthralgias, joint swelling and neck pain.   Neurological:  Negative for weakness and headaches.   Psychiatric/Behavioral:  Negative for confusion and dysphoric mood.    Comprehensive review of systems negative except as stated in HPI    The patient's Health Maintenance was reviewed and the following appears to be due:   Health Maintenance Due   Topic Date Due    Hepatitis C Screening  Never done    HIV Screening  Never done    Mammogram  Never done    Hemoglobin A1c (Diabetic Prevention Screening)  Never done       Past Medical History:  Past Medical History:   Diagnosis Date    Allergies     Anxiety disorder, unspecified     Chronic sinusitis, unspecified     Depression     GERD (gastroesophageal reflux disease)     Hyperlipidemia     Hypertension     Hypokalemia     Iron deficiency anemia, unspecified     Mild intermittent asthma, uncomplicated     Mild intermittent asthma, uncomplicated     Obstructive sleep apnea     Peripheral edema      Past Surgical History:   Procedure Laterality Date    CHOLECYSTECTOMY       HYSTERECTOMY      PLANTAR FASCIA RELEASE      SINUS SURGERY      TARSAL TUNNEL RELEASE      TONSILLECTOMY       Review of patient's allergies indicates:   Allergen Reactions    Hydrocodone-acetaminophen Hives    Promethazine Anxiety     Current Outpatient Medications on File Prior to Visit   Medication Sig Dispense Refill    albuterol-ipratropium (DUO-NEB) 2.5 mg-0.5 mg/3 mL nebulizer solution Inhale 3 mLs into the lungs.      benazepriL (LOTENSIN) 10 MG tablet TAKE 1 TABLET BY MOUTH TWICE A DAY FOR BLOOD PRESSURE 60 tablet 5    montelukast (SINGULAIR) 10 mg tablet TAKE ONE TABLET BY MOUTH EVERY DAY FOR ALLERGIES AND ASTHMA 30 tablet 5    mupirocin (BACTROBAN) 2 % ointment Apply topically 3 (three) times daily. 22 g 0    NIFEdipine (ADALAT CC) 60 MG TbSR Take by mouth.      ondansetron (ZOFRAN-ODT) 4 MG TbDL Take 1 tablet (4 mg total) by mouth every 6 (six) hours as needed (nausea). 30 tablet 3    pantoprazole (PROTONIX) 40 MG tablet TAKE 1 TABLET BY MOUTH TWICE DAILY FOR ACID REFLUX. 60 tablet 5    potassium chloride (MICRO-K) 10 MEQ CpSR Take 10 mEq by mouth once daily.      pravastatin (PRAVACHOL) 40 MG tablet Take 40 mg by mouth once daily.      silver sulfADIAZINE 1% (SILVADENE) 1 % cream Apply topically.      spironolactone (ALDACTONE) 50 MG tablet Take 1 tablet (50 mg total) by mouth once daily. 30 tablet 11    triamcinolone acetonide 0.1% (KENALOG) 0.1 % cream Apply topically 2 (two) times daily. 80 g 1    vilazodone (VIIBRYD) 40 mg Tab tablet Take 1 tablet (40 mg total) by mouth once daily. 30 tablet 11    ALPRAZolam (XANAX) 0.5 MG tablet Take 1 tablet (0.5 mg total) by mouth 2 (two) times daily as needed for Anxiety. 60 tablet 2     No current facility-administered medications on file prior to visit.     Social History     Socioeconomic History    Marital status: Single   Tobacco Use    Smoking status: Some Days     Packs/day: 0.00     Years: 0.00     Pack years: 0.00     Types: Cigarettes    Smokeless  "tobacco: Never   Substance and Sexual Activity    Alcohol use: Yes     Alcohol/week: 12.0 standard drinks     Types: 12 Cans of beer per week    Drug use: Never    Sexual activity: Not Currently     Family History   Problem Relation Age of Onset    Hypertension Mother     Dementia Mother     Arthritis Mother     Cancer Father         Non hodgekins lymphoma       Objective:       /64 (BP Location: Left arm)   Pulse 67   Temp 98.2 °F (36.8 °C) (Oral)   Resp 16   Ht 4' 11" (1.499 m)   Wt 112.5 kg (248 lb)   SpO2 97%   BMI 50.09 kg/m²      Physical Exam  Vitals and nursing note reviewed.   Constitutional:       Appearance: Normal appearance. She is obese.   HENT:      Head: Normocephalic and atraumatic.      Right Ear: Tympanic membrane, ear canal and external ear normal.      Left Ear: Tympanic membrane, ear canal and external ear normal.      Nose: Nose normal.      Mouth/Throat:      Mouth: Mucous membranes are moist.      Pharynx: Oropharynx is clear.   Eyes:      Extraocular Movements: Extraocular movements intact.      Conjunctiva/sclera: Conjunctivae normal.      Pupils: Pupils are equal, round, and reactive to light.   Cardiovascular:      Rate and Rhythm: Normal rate and regular rhythm.      Heart sounds: Normal heart sounds.   Pulmonary:      Effort: Pulmonary effort is normal.      Breath sounds: Normal breath sounds.   Musculoskeletal:         General: Normal range of motion.      Cervical back: Normal range of motion and neck supple.   Skin:     General: Skin is warm and dry.      Comments: Dorsal aspect of distal left 3rd finger with raised wart noted.   Neurological:      General: No focal deficit present.      Mental Status: She is alert and oriented to person, place, and time.   Psychiatric:         Mood and Affect: Mood normal.         Behavior: Behavior normal.         Thought Content: Thought content normal.         Judgment: Judgment normal.       Labs  Office Visit on 11/29/2022 "   Component Date Value Ref Range Status    Rapid Strep A Screen 11/29/2022 Negative  Negative Final     Acceptable 11/29/2022 Yes   Final       Assessment and Plan       ICD-10-CM ICD-9-CM   1. Wart of hand  B07.9 078.10        1. Wart of hand  Assessment & Plan:  Verbal consent obtained for wart destruction via cryotherpay using Verucca Freeze. Wart to distal dorsal aspect left 3rd digit  - destruction using Verruca Freeze cryotherapy.  Patient tolerated procedure well.  Instructed to call clinic if wart remains in 2 weeks and I will refer her to Dermatology for further management.             Follow up if symptoms worsen or fail to improve.

## 2022-12-13 NOTE — ASSESSMENT & PLAN NOTE
Verbal consent obtained for wart destruction via cryotherpay using Verucca Freeze. Wart to distal dorsal aspect left 3rd digit  - destruction using Verruca Freeze cryotherapy.  Patient tolerated procedure well.  Instructed to call clinic if wart remains in 2 weeks and I will refer her to Dermatology for further management.

## 2023-02-13 DIAGNOSIS — I10 PRIMARY HYPERTENSION: Primary | ICD-10-CM

## 2023-02-13 RX ORDER — NIFEDIPINE 60 MG/1
60 TABLET, EXTENDED RELEASE ORAL DAILY
Qty: 30 TABLET | Refills: 11 | Status: SHIPPED | OUTPATIENT
Start: 2023-02-13 | End: 2023-05-24

## 2023-03-09 ENCOUNTER — PATIENT MESSAGE (OUTPATIENT)
Dept: FAMILY MEDICINE | Facility: CLINIC | Age: 43
End: 2023-03-09
Payer: MEDICAID

## 2023-03-09 DIAGNOSIS — F41.1 GENERALIZED ANXIETY DISORDER: Primary | ICD-10-CM

## 2023-03-09 RX ORDER — BUSPIRONE HYDROCHLORIDE 15 MG/1
15 TABLET ORAL 2 TIMES DAILY
Qty: 60 TABLET | Refills: 11 | Status: SHIPPED | OUTPATIENT
Start: 2023-03-09 | End: 2023-11-28

## 2023-05-02 RX ORDER — MONTELUKAST SODIUM 10 MG/1
TABLET ORAL
Qty: 30 TABLET | Refills: 5 | Status: SHIPPED | OUTPATIENT
Start: 2023-05-02 | End: 2023-11-06

## 2023-05-17 ENCOUNTER — TELEPHONE (OUTPATIENT)
Dept: FAMILY MEDICINE | Facility: CLINIC | Age: 43
End: 2023-05-17
Payer: MEDICAID

## 2023-05-17 RX ORDER — BENAZEPRIL HYDROCHLORIDE 10 MG/1
TABLET ORAL
Qty: 60 TABLET | Refills: 5 | Status: SHIPPED | OUTPATIENT
Start: 2023-05-17 | End: 2023-11-27

## 2023-05-17 NOTE — TELEPHONE ENCOUNTER
Are there any outstanding tasks in patient's chart?    labs  2. Do we have outstanding/pending referrals?    n  3. Has the patient been seen in an ER, Urgent Care, or admitted since last visit?  n    4. Has patient seen any other health care providers since last visit?  n    5.  Has patient had any blood work or x-rays done since last visit?   Will complete labs prior to visit at [facility]- order in system

## 2023-05-20 ENCOUNTER — LAB VISIT (OUTPATIENT)
Dept: LAB | Facility: HOSPITAL | Age: 43
End: 2023-05-20
Attending: NURSE PRACTITIONER
Payer: MEDICAID

## 2023-05-20 DIAGNOSIS — Z00.00 ANNUAL PHYSICAL EXAM: ICD-10-CM

## 2023-05-20 DIAGNOSIS — Z11.4 SCREENING FOR HIV (HUMAN IMMUNODEFICIENCY VIRUS): ICD-10-CM

## 2023-05-20 DIAGNOSIS — Z13.1 SCREENING FOR DIABETES MELLITUS: ICD-10-CM

## 2023-05-20 DIAGNOSIS — Z11.59 NEED FOR HEPATITIS C SCREENING TEST: ICD-10-CM

## 2023-05-20 LAB
ALBUMIN SERPL-MCNC: 3.8 G/DL (ref 3.5–5)
ALBUMIN/GLOB SERPL: 1 RATIO (ref 1.1–2)
ALP SERPL-CCNC: 139 UNIT/L (ref 40–150)
ALT SERPL-CCNC: 19 UNIT/L (ref 0–55)
AST SERPL-CCNC: 18 UNIT/L (ref 5–34)
BASOPHILS # BLD AUTO: 0.02 X10(3)/MCL
BASOPHILS NFR BLD AUTO: 0.3 %
BILIRUBIN DIRECT+TOT PNL SERPL-MCNC: 0.4 MG/DL
BUN SERPL-MCNC: 13.9 MG/DL (ref 7–18.7)
CALCIUM SERPL-MCNC: 9.6 MG/DL (ref 8.4–10.2)
CHLORIDE SERPL-SCNC: 102 MMOL/L (ref 98–107)
CHOLEST SERPL-MCNC: 132 MG/DL
CHOLEST/HDLC SERPL: 5 {RATIO} (ref 0–5)
CO2 SERPL-SCNC: 27 MMOL/L (ref 22–29)
CREAT SERPL-MCNC: 0.7 MG/DL (ref 0.55–1.02)
EOSINOPHIL # BLD AUTO: 0.25 X10(3)/MCL (ref 0–0.9)
EOSINOPHIL NFR BLD AUTO: 3.3 %
ERYTHROCYTE [DISTWIDTH] IN BLOOD BY AUTOMATED COUNT: 13.2 % (ref 11.5–17)
EST. AVERAGE GLUCOSE BLD GHB EST-MCNC: 91.1 MG/DL
GFR SERPLBLD CREATININE-BSD FMLA CKD-EPI: >60 MLS/MIN/1.73/M2
GLOBULIN SER-MCNC: 4 GM/DL (ref 2.4–3.5)
GLUCOSE SERPL-MCNC: 102 MG/DL (ref 74–100)
HBA1C MFR BLD: 4.8 %
HCT VFR BLD AUTO: 41.1 % (ref 37–47)
HCV AB SERPL QL IA: NONREACTIVE
HDLC SERPL-MCNC: 27 MG/DL (ref 35–60)
HGB BLD-MCNC: 12.9 G/DL (ref 12–16)
HIV 1+2 AB+HIV1 P24 AG SERPL QL IA: NONREACTIVE
IMM GRANULOCYTES # BLD AUTO: 0.03 X10(3)/MCL (ref 0–0.04)
IMM GRANULOCYTES NFR BLD AUTO: 0.4 %
LDLC SERPL CALC-MCNC: 73 MG/DL (ref 50–140)
LYMPHOCYTES # BLD AUTO: 2.08 X10(3)/MCL (ref 0.6–4.6)
LYMPHOCYTES NFR BLD AUTO: 27.5 %
MCH RBC QN AUTO: 28.1 PG (ref 27–31)
MCHC RBC AUTO-ENTMCNC: 31.4 G/DL (ref 33–36)
MCV RBC AUTO: 89.5 FL (ref 80–94)
MONOCYTES # BLD AUTO: 0.4 X10(3)/MCL (ref 0.1–1.3)
MONOCYTES NFR BLD AUTO: 5.3 %
NEUTROPHILS # BLD AUTO: 4.79 X10(3)/MCL (ref 2.1–9.2)
NEUTROPHILS NFR BLD AUTO: 63.2 %
PLATELET # BLD AUTO: 280 X10(3)/MCL (ref 130–400)
PMV BLD AUTO: 9.1 FL (ref 7.4–10.4)
POTASSIUM SERPL-SCNC: 4 MMOL/L (ref 3.5–5.1)
PROT SERPL-MCNC: 7.8 GM/DL (ref 6.4–8.3)
RBC # BLD AUTO: 4.59 X10(6)/MCL (ref 4.2–5.4)
SODIUM SERPL-SCNC: 139 MMOL/L (ref 136–145)
TRIGL SERPL-MCNC: 159 MG/DL (ref 37–140)
TSH SERPL-ACNC: 0.76 UIU/ML (ref 0.35–4.94)
VLDLC SERPL CALC-MCNC: 32 MG/DL
WBC # SPEC AUTO: 7.57 X10(3)/MCL (ref 4.5–11.5)

## 2023-05-20 PROCEDURE — 86803 HEPATITIS C AB TEST: CPT

## 2023-05-20 PROCEDURE — 83036 HEMOGLOBIN GLYCOSYLATED A1C: CPT

## 2023-05-20 PROCEDURE — 84443 ASSAY THYROID STIM HORMONE: CPT

## 2023-05-20 PROCEDURE — 85025 COMPLETE CBC W/AUTO DIFF WBC: CPT

## 2023-05-20 PROCEDURE — 36415 COLL VENOUS BLD VENIPUNCTURE: CPT

## 2023-05-20 PROCEDURE — 80061 LIPID PANEL: CPT

## 2023-05-20 PROCEDURE — 80053 COMPREHEN METABOLIC PANEL: CPT

## 2023-05-20 PROCEDURE — 87389 HIV-1 AG W/HIV-1&-2 AB AG IA: CPT

## 2023-05-24 ENCOUNTER — OFFICE VISIT (OUTPATIENT)
Dept: FAMILY MEDICINE | Facility: CLINIC | Age: 43
End: 2023-05-24
Payer: MEDICAID

## 2023-05-24 VITALS
SYSTOLIC BLOOD PRESSURE: 120 MMHG | RESPIRATION RATE: 16 BRPM | HEART RATE: 75 BPM | WEIGHT: 255.63 LBS | OXYGEN SATURATION: 97 % | HEIGHT: 59 IN | DIASTOLIC BLOOD PRESSURE: 82 MMHG | BODY MASS INDEX: 51.53 KG/M2 | TEMPERATURE: 98 F

## 2023-05-24 DIAGNOSIS — Z11.4 SCREENING FOR HIV (HUMAN IMMUNODEFICIENCY VIRUS): ICD-10-CM

## 2023-05-24 DIAGNOSIS — Z11.59 NEED FOR HEPATITIS C SCREENING TEST: ICD-10-CM

## 2023-05-24 DIAGNOSIS — E87.6 HYPOKALEMIA: ICD-10-CM

## 2023-05-24 DIAGNOSIS — E78.5 HYPERLIPIDEMIA, UNSPECIFIED HYPERLIPIDEMIA TYPE: ICD-10-CM

## 2023-05-24 DIAGNOSIS — Z12.31 BREAST CANCER SCREENING BY MAMMOGRAM: ICD-10-CM

## 2023-05-24 DIAGNOSIS — F41.1 GENERALIZED ANXIETY DISORDER: ICD-10-CM

## 2023-05-24 DIAGNOSIS — I10 PRIMARY HYPERTENSION: ICD-10-CM

## 2023-05-24 DIAGNOSIS — F32.9 MAJOR DEPRESSIVE DISORDER, REMISSION STATUS UNSPECIFIED, UNSPECIFIED WHETHER RECURRENT: ICD-10-CM

## 2023-05-24 DIAGNOSIS — Z00.00 ANNUAL PHYSICAL EXAM: Primary | ICD-10-CM

## 2023-05-24 DIAGNOSIS — G47.33 OBSTRUCTIVE SLEEP APNEA SYNDROME: ICD-10-CM

## 2023-05-24 PROCEDURE — 4010F PR ACE/ARB THEARPY RXD/TAKEN: ICD-10-PCS | Mod: CPTII,,, | Performed by: NURSE PRACTITIONER

## 2023-05-24 PROCEDURE — 3074F PR MOST RECENT SYSTOLIC BLOOD PRESSURE < 130 MM HG: ICD-10-PCS | Mod: CPTII,,, | Performed by: NURSE PRACTITIONER

## 2023-05-24 PROCEDURE — 1160F RVW MEDS BY RX/DR IN RCRD: CPT | Mod: CPTII,,, | Performed by: NURSE PRACTITIONER

## 2023-05-24 PROCEDURE — 3074F SYST BP LT 130 MM HG: CPT | Mod: CPTII,,, | Performed by: NURSE PRACTITIONER

## 2023-05-24 PROCEDURE — 3008F PR BODY MASS INDEX (BMI) DOCUMENTED: ICD-10-PCS | Mod: CPTII,,, | Performed by: NURSE PRACTITIONER

## 2023-05-24 PROCEDURE — 99396 PREV VISIT EST AGE 40-64: CPT | Mod: ,,, | Performed by: NURSE PRACTITIONER

## 2023-05-24 PROCEDURE — 4010F ACE/ARB THERAPY RXD/TAKEN: CPT | Mod: CPTII,,, | Performed by: NURSE PRACTITIONER

## 2023-05-24 PROCEDURE — 3079F PR MOST RECENT DIASTOLIC BLOOD PRESSURE 80-89 MM HG: ICD-10-PCS | Mod: CPTII,,, | Performed by: NURSE PRACTITIONER

## 2023-05-24 PROCEDURE — 1159F MED LIST DOCD IN RCRD: CPT | Mod: CPTII,,, | Performed by: NURSE PRACTITIONER

## 2023-05-24 PROCEDURE — 99396 PR PREVENTIVE VISIT,EST,40-64: ICD-10-PCS | Mod: ,,, | Performed by: NURSE PRACTITIONER

## 2023-05-24 PROCEDURE — 1160F PR REVIEW ALL MEDS BY PRESCRIBER/CLIN PHARMACIST DOCUMENTED: ICD-10-PCS | Mod: CPTII,,, | Performed by: NURSE PRACTITIONER

## 2023-05-24 PROCEDURE — 1159F PR MEDICATION LIST DOCUMENTED IN MEDICAL RECORD: ICD-10-PCS | Mod: CPTII,,, | Performed by: NURSE PRACTITIONER

## 2023-05-24 PROCEDURE — 3079F DIAST BP 80-89 MM HG: CPT | Mod: CPTII,,, | Performed by: NURSE PRACTITIONER

## 2023-05-24 PROCEDURE — 3008F BODY MASS INDEX DOCD: CPT | Mod: CPTII,,, | Performed by: NURSE PRACTITIONER

## 2023-05-24 RX ORDER — NIFEDIPINE 60 MG/1
TABLET, EXTENDED RELEASE ORAL
COMMUNITY
Start: 2023-05-17 | End: 2024-02-26

## 2023-05-24 NOTE — ASSESSMENT & PLAN NOTE
Stable, reports nightly compliance with auto PAP therapy with relief of excessive daytime sleepiness and restless sleep.  Recommend continued auto PAP at 5-20 cm H2O.

## 2023-05-24 NOTE — PROGRESS NOTES
Subjective:       Patient ID: Malia Schroeder is a 43 y.o. female.    Chief Complaint: Annual Exam      HPI   This is a 43-year-old white female who presents to clinic today for an annual wellness exam.  Patient has an active problem list which includes anxiety, depression, chronic sinusitis, wart, asthma, hyperlipidemia, hypertension, hypokalemia, allergies, obstructive sleep apnea, peripheral edema.  Patient states overall she is doing well.  No complaints today.  Review of Systems  Comprehensive review of systems negative except as stated in HPI    The patient's Health Maintenance was reviewed and the following appears to be due:   There are no preventive care reminders to display for this patient.    Past Medical History:  Past Medical History:   Diagnosis Date    Allergies     Anxiety disorder, unspecified     Chronic sinusitis, unspecified     Depression     GERD (gastroesophageal reflux disease)     Hyperlipidemia     Hypertension     Hypokalemia     Iron deficiency anemia, unspecified     Mild intermittent asthma, uncomplicated     Mild intermittent asthma, uncomplicated     Obstructive sleep apnea     Peripheral edema      Past Surgical History:   Procedure Laterality Date    CHOLECYSTECTOMY      HYSTERECTOMY      PLANTAR FASCIA RELEASE      SINUS SURGERY      TARSAL TUNNEL RELEASE      TONSILLECTOMY       Review of patient's allergies indicates:   Allergen Reactions    Hydrocodone-acetaminophen Hives    Promethazine Anxiety     Current Outpatient Medications on File Prior to Visit   Medication Sig Dispense Refill    benazepriL (LOTENSIN) 10 MG tablet TAKE 1 TABLET BY MOUTH TWICE A DAY FOR BLOOD PRESSURE 60 tablet 5    busPIRone (BUSPAR) 15 MG tablet Take 1 tablet (15 mg total) by mouth 2 (two) times daily. 60 tablet 11    montelukast (SINGULAIR) 10 mg tablet TAKE ONE TABLET BY MOUTH EVERY DAY FOR ALLERGIES AND ASTHMA 30 tablet 5    NIFEdipine (PROCARDIA-XL) 60 MG (OSM) 24 hr tablet       ondansetron  (ZOFRAN-ODT) 4 MG TbDL Take 1 tablet (4 mg total) by mouth every 6 (six) hours as needed (nausea). 30 tablet 3    pantoprazole (PROTONIX) 40 MG tablet TAKE 1 TABLET BY MOUTH TWICE DAILY FOR ACID REFLUX. 60 tablet 5    potassium chloride (MICRO-K) 10 MEQ CpSR TAKE TWO CAPSULES BY MOUTH TWICE A DAY (Patient taking differently: 10 mEq once.) 120 capsule 5    pravastatin (PRAVACHOL) 40 MG tablet Take 40 mg by mouth once daily.      spironolactone (ALDACTONE) 50 MG tablet Take 1 tablet (50 mg total) by mouth once daily. 30 tablet 11    albuterol-ipratropium (DUO-NEB) 2.5 mg-0.5 mg/3 mL nebulizer solution Inhale 3 mLs into the lungs.      ALPRAZolam (XANAX) 0.5 MG tablet Take 1 tablet (0.5 mg total) by mouth 2 (two) times daily as needed for Anxiety. 60 tablet 2    vilazodone (VIIBRYD) 40 mg Tab tablet Take 1 tablet (40 mg total) by mouth once daily. 30 tablet 11    [DISCONTINUED] mupirocin (BACTROBAN) 2 % ointment Apply topically 3 (three) times daily. 22 g 0    [DISCONTINUED] NIFEdipine (ADALAT CC) 60 MG TbSR Take 1 tablet (60 mg total) by mouth once daily. 30 tablet 11    [DISCONTINUED] silver sulfADIAZINE 1% (SILVADENE) 1 % cream Apply topically.      [DISCONTINUED] triamcinolone acetonide 0.1% (KENALOG) 0.1 % cream Apply topically 2 (two) times daily. 80 g 1     No current facility-administered medications on file prior to visit.     Social History     Socioeconomic History    Marital status: Single   Tobacco Use    Smoking status: Some Days     Packs/day: 0.00     Years: 0.00     Pack years: 0.00     Types: Cigarettes    Smokeless tobacco: Never   Substance and Sexual Activity    Alcohol use: Not Currently     Comment: socially    Drug use: Never    Sexual activity: Not Currently     Family History   Problem Relation Age of Onset    Hypertension Mother     Dementia Mother     Arthritis Mother     Cancer Father         Non hodgekins lymphoma       Objective:       /82 (BP Location: Left arm)   Pulse 75   Temp  "98.3 °F (36.8 °C) (Oral)   Resp 16   Ht 4' 11" (1.499 m)   Wt 115.9 kg (255 lb 9.6 oz)   SpO2 97%   BMI 51.62 kg/m²      Physical Exam  Vitals and nursing note reviewed.   Constitutional:       Appearance: Normal appearance. She is obese.   HENT:      Head: Normocephalic and atraumatic.      Right Ear: Tympanic membrane, ear canal and external ear normal.      Left Ear: Tympanic membrane, ear canal and external ear normal.      Nose: Nose normal.      Mouth/Throat:      Mouth: Mucous membranes are moist.      Pharynx: Oropharynx is clear.   Eyes:      Extraocular Movements: Extraocular movements intact.      Conjunctiva/sclera: Conjunctivae normal.      Pupils: Pupils are equal, round, and reactive to light.   Cardiovascular:      Rate and Rhythm: Normal rate and regular rhythm.      Heart sounds: Normal heart sounds.   Pulmonary:      Effort: Pulmonary effort is normal.      Breath sounds: Normal breath sounds.   Abdominal:      General: Abdomen is flat. Bowel sounds are normal.      Palpations: Abdomen is soft.   Musculoskeletal:         General: Normal range of motion.      Cervical back: Normal range of motion and neck supple.   Skin:     General: Skin is warm and dry.   Neurological:      General: No focal deficit present.      Mental Status: She is alert and oriented to person, place, and time.   Psychiatric:         Mood and Affect: Mood normal.         Behavior: Behavior normal.         Thought Content: Thought content normal.         Judgment: Judgment normal.       Labs  Lab Visit on 05/20/2023   Component Date Value Ref Range Status    Sodium Level 05/20/2023 139  136 - 145 mmol/L Final    Potassium Level 05/20/2023 4.0  3.5 - 5.1 mmol/L Final    Chloride 05/20/2023 102  98 - 107 mmol/L Final    Carbon Dioxide 05/20/2023 27  22 - 29 mmol/L Final    Glucose Level 05/20/2023 102 (H)  74 - 100 mg/dL Final    Blood Urea Nitrogen 05/20/2023 13.9  7.0 - 18.7 mg/dL Final    Creatinine 05/20/2023 0.70  0.55 " - 1.02 mg/dL Final    Calcium Level Total 05/20/2023 9.6  8.4 - 10.2 mg/dL Final    Protein Total 05/20/2023 7.8  6.4 - 8.3 gm/dL Final    Albumin Level 05/20/2023 3.8  3.5 - 5.0 g/dL Final    Globulin 05/20/2023 4.0 (H)  2.4 - 3.5 gm/dL Final    Albumin/Globulin Ratio 05/20/2023 1.0 (L)  1.1 - 2.0 ratio Final    Bilirubin Total 05/20/2023 0.4  <=1.5 mg/dL Final    Alkaline Phosphatase 05/20/2023 139  40 - 150 unit/L Final    Alanine Aminotransferase 05/20/2023 19  0 - 55 unit/L Final    Aspartate Aminotransferase 05/20/2023 18  5 - 34 unit/L Final    eGFR 05/20/2023 >60  mls/min/1.73/m2 Final    Cholesterol Total 05/20/2023 132  <=200 mg/dL Final    HDL Cholesterol 05/20/2023 27 (L)  35 - 60 mg/dL Final    Triglyceride 05/20/2023 159 (H)  37 - 140 mg/dL Final    Cholesterol/HDL Ratio 05/20/2023 5  0 - 5 Final    Very Low Density Lipoprotein 05/20/2023 32   Final    LDL Cholesterol 05/20/2023 73.00  50.00 - 140.00 mg/dL Final    Thyroid Stimulating Hormone 05/20/2023 0.757  0.350 - 4.940 uIU/mL Final    Hemoglobin A1c 05/20/2023 4.8  <=7.0 % Final    Estimated Average Glucose 05/20/2023 91.1  mg/dL Final    Hepatitis C Antibody 05/20/2023 Nonreactive  Nonreactive Final    HIV 05/20/2023 Nonreactive  Nonreactive Final    WBC 05/20/2023 7.57  4.50 - 11.50 x10(3)/mcL Final    RBC 05/20/2023 4.59  4.20 - 5.40 x10(6)/mcL Final    Hgb 05/20/2023 12.9  12.0 - 16.0 g/dL Final    Hct 05/20/2023 41.1  37.0 - 47.0 % Final    MCV 05/20/2023 89.5  80.0 - 94.0 fL Final    MCH 05/20/2023 28.1  27.0 - 31.0 pg Final    MCHC 05/20/2023 31.4 (L)  33.0 - 36.0 g/dL Final    RDW 05/20/2023 13.2  11.5 - 17.0 % Final    Platelet 05/20/2023 280  130 - 400 x10(3)/mcL Final    MPV 05/20/2023 9.1  7.4 - 10.4 fL Final    Neut % 05/20/2023 63.2  % Final    Lymph % 05/20/2023 27.5  % Final    Mono % 05/20/2023 5.3  % Final    Eos % 05/20/2023 3.3  % Final    Basophil % 05/20/2023 0.3  % Final    Lymph # 05/20/2023 2.08  0.6 - 4.6 x10(3)/mcL Final     Neut # 05/20/2023 4.79  2.1 - 9.2 x10(3)/mcL Final    Mono # 05/20/2023 0.40  0.1 - 1.3 x10(3)/mcL Final    Eos # 05/20/2023 0.25  0 - 0.9 x10(3)/mcL Final    Baso # 05/20/2023 0.02  <=0.2 x10(3)/mcL Final    IG# 05/20/2023 0.03  0 - 0.04 x10(3)/mcL Final    IG% 05/20/2023 0.4  % Final   Office Visit on 11/29/2022   Component Date Value Ref Range Status    Rapid Strep A Screen 11/29/2022 Negative  Negative Final     Acceptable 11/29/2022 Yes   Final       Assessment and Plan       ICD-10-CM ICD-9-CM   1. Annual physical exam  Z00.00 V70.0   2. Need for hepatitis C screening test  Z11.59 V73.89   3. Screening for HIV (human immunodeficiency virus)  Z11.4 V73.89   4. Primary hypertension  I10 401.9   5. Major depressive disorder, remission status unspecified, unspecified whether recurrent  F32.9 296.20   6. Generalized anxiety disorder  F41.1 300.02   7. Obstructive sleep apnea syndrome  G47.33 327.23   8. Hypokalemia  E87.6 276.8   9. Breast cancer screening by mammogram  Z12.31 V76.12   10. Hyperlipidemia, unspecified hyperlipidemia type  E78.5 272.4        1. Annual physical exam  Overview:  Annual exam yearly in May      2. Need for hepatitis C screening test  Comments:  Nonreactive    3. Screening for HIV (human immunodeficiency virus)  Comments:  Nonreactive    4. Primary hypertension  Overview:  DX by previous provider, initially on benazepril 10 mg daily   09/17/2021 - benazepril 10 mg BID  11/17/2021 - add nifedipine 30 mg daily  11/22/2021 - increase nifedipine to 60 mg daily   12/02/2021 - spironolactone 50 mg daily added per Dr Saldivar      Assessment & Plan:  Stable, continue current medications, follow-up 6 months.      5. Major depressive disorder, remission status unspecified, unspecified whether recurrent  Overview:  Effexor - did not work, made her feel worse  Zoloft > 10 years, DC on 10/15/2021  10/15/2021 - DC Zoloft 200 mg daily, start Viibryd 20 mg daily   02/03/2022 - Viibryd  40 mg daily     Assessment & Plan:  Stable, continue Viibryd 40 mg daily, follow-up 6 months.      6. Generalized anxiety disorder  Overview:  Xanax 0.5 mg BID prn     Effexor - did not work, made her feel worse  Zoloft > 10 years, DC on 10/15/2021  10/15/2021 - DC Zoloft 200 mg daily, start Viibryd 20 mg daily   02/03/2022 - Viibryd 40 mg daily   03/09/2023 - add BuSpar 15 mg twice daily    Assessment & Plan:  Stable, continue Viibryd, BuSpar, Xanax.  Follow-up 6 months.      7. Obstructive sleep apnea syndrome  Overview:  HST per [facility] 07/25/2017- AHI 46  Previously managed by Keiko Rutherford NP  AutoPap 5-20 cm H2O    Assessment & Plan:  Stable, reports nightly compliance with auto PAP therapy with relief of excessive daytime sleepiness and restless sleep.  Recommend continued auto PAP at 5-20 cm H2O.      8. Hypokalemia  Overview:  Potassium chloride (Micro-K) 10 mEq cap once daily  Spironolactone 50 mg daily     Assessment & Plan:  Stable, potassium 4.0, recheck 6 months.    Orders:  -     Comprehensive Metabolic Panel; Future; Expected date: 11/24/2023    9. Breast cancer screening by mammogram  Assessment & Plan:  Patient still declining mammogram at this time.      10. Hyperlipidemia, unspecified hyperlipidemia type  Overview:  Pravastatin 40 mg daily    Assessment & Plan:  Stable, total cholesterol 132, HDL 27, triglycerides 159, LDL 73.  Continue pravastatin 40 mg daily, follow-up 6 months.    Orders:  -     Lipid Panel; Future; Expected date: 11/24/2023  -     Comprehensive Metabolic Panel; Future; Expected date: 11/24/2023           Follow up in about 6 months (around 11/24/2023) for follow up.

## 2023-05-24 NOTE — ASSESSMENT & PLAN NOTE
Stable, total cholesterol 132, HDL 27, triglycerides 159, LDL 73.  Continue pravastatin 40 mg daily, follow-up 6 months.

## 2023-06-07 RX ORDER — PREDNISONE 20 MG/1
20 TABLET ORAL DAILY
Qty: 5 TABLET | Refills: 0 | Status: SHIPPED | OUTPATIENT
Start: 2023-06-07 | End: 2023-10-19 | Stop reason: ALTCHOICE

## 2023-06-07 RX ORDER — CHLOPHEDIANOL HCL AND PYRILAMINE MALEATE 12.5; 12.5 MG/5ML; MG/5ML
10 SOLUTION ORAL EVERY 8 HOURS PRN
Qty: 200 ML | Refills: 0 | Status: SHIPPED | OUTPATIENT
Start: 2023-06-07 | End: 2023-09-21 | Stop reason: SDUPTHER

## 2023-07-10 ENCOUNTER — HOSPITAL ENCOUNTER (OUTPATIENT)
Dept: RADIOLOGY | Facility: HOSPITAL | Age: 43
Discharge: HOME | End: 2023-07-10
Attending: NURSE PRACTITIONER
Payer: MEDICAID

## 2023-07-10 ENCOUNTER — PATIENT MESSAGE (OUTPATIENT)
Dept: FAMILY MEDICINE | Facility: CLINIC | Age: 43
End: 2023-07-10
Payer: MEDICAID

## 2023-07-10 DIAGNOSIS — M79.671 RIGHT FOOT PAIN: Primary | ICD-10-CM

## 2023-07-10 DIAGNOSIS — M79.671 RIGHT FOOT PAIN: ICD-10-CM

## 2023-07-10 PROCEDURE — 73630 X-RAY EXAM OF FOOT: CPT | Mod: TC,RT

## 2023-07-10 NOTE — PROGRESS NOTES
Your x-ray looks good, no fractures or other concerns.  If you feel like the boot is making it worse, go back to a regular tennis shoe.  If still not better after that, we can try and get you set up with a foot doctor.

## 2023-08-08 ENCOUNTER — TELEPHONE (OUTPATIENT)
Dept: FAMILY MEDICINE | Facility: CLINIC | Age: 43
End: 2023-08-08
Payer: MEDICAID

## 2023-08-08 DIAGNOSIS — F41.1 GENERALIZED ANXIETY DISORDER: Primary | ICD-10-CM

## 2023-08-08 RX ORDER — ALPRAZOLAM 0.5 MG/1
0.5 TABLET ORAL 2 TIMES DAILY PRN
Qty: 60 TABLET | Refills: 2 | Status: SHIPPED | OUTPATIENT
Start: 2023-08-08 | End: 2023-08-08 | Stop reason: RX

## 2023-08-08 RX ORDER — ALPRAZOLAM 1 MG/1
0.5 TABLET ORAL 2 TIMES DAILY
Qty: 60 TABLET | Refills: 2 | Status: SHIPPED | OUTPATIENT
Start: 2023-08-08 | End: 2023-08-28 | Stop reason: SDUPTHER

## 2023-08-08 NOTE — TELEPHONE ENCOUNTER
Call from [facility] called stating that they are unable to fill Xanax .5 mg.  They requesting a script on 1mg to take 1/2 tablet instead.

## 2023-08-21 ENCOUNTER — DOCUMENTATION ONLY (OUTPATIENT)
Dept: FAMILY MEDICINE | Facility: CLINIC | Age: 43
End: 2023-08-21
Payer: MEDICAID

## 2023-08-21 DIAGNOSIS — M25.522 LEFT ELBOW PAIN: Primary | ICD-10-CM

## 2023-08-21 NOTE — PROGRESS NOTES
Patient called with complaints of left elbow pain that has been going on now for a couple of months.  States it is getting so bad that she can not even  anything with her left hand without significant pain.  Requesting referral to Orthopedics.  Referral printed for [facility]

## 2023-08-27 ENCOUNTER — PATIENT MESSAGE (OUTPATIENT)
Dept: FAMILY MEDICINE | Facility: CLINIC | Age: 43
End: 2023-08-27
Payer: MEDICAID

## 2023-08-27 DIAGNOSIS — M25.522 LEFT ELBOW PAIN: Primary | ICD-10-CM

## 2023-08-27 DIAGNOSIS — F41.1 GENERALIZED ANXIETY DISORDER: ICD-10-CM

## 2023-08-28 PROBLEM — Z00.00 ANNUAL PHYSICAL EXAM: Status: RESOLVED | Noted: 2023-05-24 | Resolved: 2023-08-28

## 2023-08-28 RX ORDER — ALPRAZOLAM 1 MG/1
1 TABLET ORAL 2 TIMES DAILY
Qty: 60 TABLET | Refills: 2 | Status: SHIPPED | OUTPATIENT
Start: 2023-08-28 | End: 2023-12-04 | Stop reason: SDUPTHER

## 2023-09-21 ENCOUNTER — PATIENT MESSAGE (OUTPATIENT)
Dept: FAMILY MEDICINE | Facility: CLINIC | Age: 43
End: 2023-09-21
Payer: MEDICAID

## 2023-09-21 RX ORDER — METHYLPREDNISOLONE 4 MG/1
TABLET ORAL
Qty: 21 EACH | Refills: 0 | Status: SHIPPED | OUTPATIENT
Start: 2023-09-21 | End: 2023-10-19

## 2023-09-21 RX ORDER — CHLOPHEDIANOL HCL AND PYRILAMINE MALEATE 12.5; 12.5 MG/5ML; MG/5ML
10 SOLUTION ORAL EVERY 8 HOURS PRN
Qty: 200 ML | Refills: 0 | Status: SHIPPED | OUTPATIENT
Start: 2023-09-21 | End: 2023-10-19 | Stop reason: ALTCHOICE

## 2023-09-21 RX ORDER — AZITHROMYCIN 250 MG/1
TABLET, FILM COATED ORAL
Qty: 6 TABLET | Refills: 0 | Status: SHIPPED | OUTPATIENT
Start: 2023-09-21 | End: 2023-09-26

## 2023-10-09 DIAGNOSIS — F32.9 MAJOR DEPRESSIVE DISORDER, REMISSION STATUS UNSPECIFIED, UNSPECIFIED WHETHER RECURRENT: ICD-10-CM

## 2023-10-09 RX ORDER — VILAZODONE HYDROCHLORIDE 40 MG/1
TABLET ORAL
Qty: 30 TABLET | Refills: 11 | Status: SHIPPED | OUTPATIENT
Start: 2023-10-09

## 2023-10-12 ENCOUNTER — TELEPHONE (OUTPATIENT)
Dept: FAMILY MEDICINE | Facility: CLINIC | Age: 43
End: 2023-10-12
Payer: MEDICAID

## 2023-10-12 DIAGNOSIS — M25.522 LEFT ELBOW PAIN: Primary | ICD-10-CM

## 2023-10-12 RX ORDER — MELOXICAM 15 MG/1
15 TABLET ORAL DAILY
Qty: 30 TABLET | Refills: 11 | Status: SHIPPED | OUTPATIENT
Start: 2023-10-12

## 2023-10-12 NOTE — TELEPHONE ENCOUNTER
Patient called with complaints of continued left elbow pain, pain radiating all the way down into her hand, middle and ring finger.  Ortho referral to [facility] rejected because patient had not come in for a visit or an x-ray.  X-ray ordered, patient aware.  Meloxicam sent to pharmacy.  Please get her scheduled to come in for a visit for referral.

## 2023-10-13 ENCOUNTER — HOSPITAL ENCOUNTER (OUTPATIENT)
Dept: RADIOLOGY | Facility: HOSPITAL | Age: 43
Discharge: HOME | End: 2023-10-13
Attending: NURSE PRACTITIONER
Payer: MEDICAID

## 2023-10-13 DIAGNOSIS — M25.522 LEFT ELBOW PAIN: ICD-10-CM

## 2023-10-13 PROCEDURE — 73080 X-RAY EXAM OF ELBOW: CPT | Mod: TC,LT

## 2023-10-19 ENCOUNTER — OFFICE VISIT (OUTPATIENT)
Dept: FAMILY MEDICINE | Facility: CLINIC | Age: 43
End: 2023-10-19
Payer: MEDICAID

## 2023-10-19 VITALS
SYSTOLIC BLOOD PRESSURE: 112 MMHG | HEIGHT: 59 IN | WEIGHT: 255.19 LBS | DIASTOLIC BLOOD PRESSURE: 60 MMHG | OXYGEN SATURATION: 99 % | TEMPERATURE: 98 F | HEART RATE: 70 BPM | BODY MASS INDEX: 51.44 KG/M2 | RESPIRATION RATE: 16 BRPM

## 2023-10-19 DIAGNOSIS — M77.12 LATERAL EPICONDYLITIS OF LEFT ELBOW: Primary | ICD-10-CM

## 2023-10-19 PROCEDURE — 3044F PR MOST RECENT HEMOGLOBIN A1C LEVEL <7.0%: ICD-10-PCS | Mod: CPTII,,, | Performed by: NURSE PRACTITIONER

## 2023-10-19 PROCEDURE — 1160F PR REVIEW ALL MEDS BY PRESCRIBER/CLIN PHARMACIST DOCUMENTED: ICD-10-PCS | Mod: CPTII,,, | Performed by: NURSE PRACTITIONER

## 2023-10-19 PROCEDURE — 3078F PR MOST RECENT DIASTOLIC BLOOD PRESSURE < 80 MM HG: ICD-10-PCS | Mod: CPTII,,, | Performed by: NURSE PRACTITIONER

## 2023-10-19 PROCEDURE — 3078F DIAST BP <80 MM HG: CPT | Mod: CPTII,,, | Performed by: NURSE PRACTITIONER

## 2023-10-19 PROCEDURE — 1159F PR MEDICATION LIST DOCUMENTED IN MEDICAL RECORD: ICD-10-PCS | Mod: CPTII,,, | Performed by: NURSE PRACTITIONER

## 2023-10-19 PROCEDURE — 3008F BODY MASS INDEX DOCD: CPT | Mod: CPTII,,, | Performed by: NURSE PRACTITIONER

## 2023-10-19 PROCEDURE — 4010F ACE/ARB THERAPY RXD/TAKEN: CPT | Mod: CPTII,,, | Performed by: NURSE PRACTITIONER

## 2023-10-19 PROCEDURE — 1159F MED LIST DOCD IN RCRD: CPT | Mod: CPTII,,, | Performed by: NURSE PRACTITIONER

## 2023-10-19 PROCEDURE — 99213 OFFICE O/P EST LOW 20 MIN: CPT | Mod: ,,, | Performed by: NURSE PRACTITIONER

## 2023-10-19 PROCEDURE — 3074F SYST BP LT 130 MM HG: CPT | Mod: CPTII,,, | Performed by: NURSE PRACTITIONER

## 2023-10-19 PROCEDURE — 4010F PR ACE/ARB THEARPY RXD/TAKEN: ICD-10-PCS | Mod: CPTII,,, | Performed by: NURSE PRACTITIONER

## 2023-10-19 PROCEDURE — 3008F PR BODY MASS INDEX (BMI) DOCUMENTED: ICD-10-PCS | Mod: CPTII,,, | Performed by: NURSE PRACTITIONER

## 2023-10-19 PROCEDURE — 1160F RVW MEDS BY RX/DR IN RCRD: CPT | Mod: CPTII,,, | Performed by: NURSE PRACTITIONER

## 2023-10-19 PROCEDURE — 3044F HG A1C LEVEL LT 7.0%: CPT | Mod: CPTII,,, | Performed by: NURSE PRACTITIONER

## 2023-10-19 PROCEDURE — 3074F PR MOST RECENT SYSTOLIC BLOOD PRESSURE < 130 MM HG: ICD-10-PCS | Mod: CPTII,,, | Performed by: NURSE PRACTITIONER

## 2023-10-19 PROCEDURE — 99213 PR OFFICE/OUTPT VISIT, EST, LEVL III, 20-29 MIN: ICD-10-PCS | Mod: ,,, | Performed by: NURSE PRACTITIONER

## 2023-10-19 NOTE — ASSESSMENT & PLAN NOTE
Continue Mobic for pain.  X-ray normal.  Refer to [facility] ortho clinic for further management.  Previous history of lateral epicondylitis in the right elbow for which she failed injection and required surgery.

## 2023-11-06 DIAGNOSIS — J32.0 CHRONIC MAXILLARY SINUSITIS: Primary | ICD-10-CM

## 2023-11-06 RX ORDER — MONTELUKAST SODIUM 10 MG/1
TABLET ORAL
Qty: 30 TABLET | Refills: 5 | Status: SHIPPED | OUTPATIENT
Start: 2023-11-06 | End: 2023-12-18 | Stop reason: SDUPTHER

## 2023-11-13 DIAGNOSIS — I10 HYPERTENSION, UNSPECIFIED TYPE: ICD-10-CM

## 2023-11-13 RX ORDER — SPIRONOLACTONE 50 MG/1
50 TABLET, FILM COATED ORAL
Qty: 30 TABLET | Refills: 11 | Status: SHIPPED | OUTPATIENT
Start: 2023-11-13

## 2023-11-16 ENCOUNTER — TELEPHONE (OUTPATIENT)
Dept: FAMILY MEDICINE | Facility: CLINIC | Age: 43
End: 2023-11-16
Payer: MEDICAID

## 2023-11-16 NOTE — TELEPHONE ENCOUNTER
Are there any outstanding tasks in patient's chart?    LABS  2. Do we have outstanding/pending referrals?    N  3. Has the patient been seen in an ER, Urgent Care, or admitted since last visit?    N  4. Has patient seen any other health care providers since last visit?    N  5.  Has patient had any blood work or x-rays done since last visit?   WILL COMPLETE LABS AT [facility]

## 2023-11-17 ENCOUNTER — LAB VISIT (OUTPATIENT)
Dept: LAB | Facility: HOSPITAL | Age: 43
End: 2023-11-17
Attending: NURSE PRACTITIONER
Payer: MEDICAID

## 2023-11-17 DIAGNOSIS — E78.5 HYPERLIPIDEMIA, UNSPECIFIED HYPERLIPIDEMIA TYPE: ICD-10-CM

## 2023-11-17 DIAGNOSIS — E87.6 HYPOKALEMIA: ICD-10-CM

## 2023-11-17 LAB
ALBUMIN SERPL-MCNC: 3.8 G/DL (ref 3.5–5)
ALBUMIN/GLOB SERPL: 1.1 RATIO (ref 1.1–2)
ALP SERPL-CCNC: 135 UNIT/L (ref 40–150)
ALT SERPL-CCNC: 17 UNIT/L (ref 0–55)
AST SERPL-CCNC: 18 UNIT/L (ref 5–34)
BILIRUB SERPL-MCNC: 0.5 MG/DL
BUN SERPL-MCNC: 16 MG/DL (ref 7–18.7)
CALCIUM SERPL-MCNC: 9.3 MG/DL (ref 8.4–10.2)
CHLORIDE SERPL-SCNC: 103 MMOL/L (ref 98–107)
CHOLEST SERPL-MCNC: 134 MG/DL
CHOLEST/HDLC SERPL: 4 {RATIO} (ref 0–5)
CO2 SERPL-SCNC: 28 MMOL/L (ref 22–29)
CREAT SERPL-MCNC: 0.77 MG/DL (ref 0.55–1.02)
GFR SERPLBLD CREATININE-BSD FMLA CKD-EPI: >60 MLS/MIN/1.73/M2
GLOBULIN SER-MCNC: 3.4 GM/DL (ref 2.4–3.5)
GLUCOSE SERPL-MCNC: 87 MG/DL (ref 74–100)
HDLC SERPL-MCNC: 30 MG/DL (ref 35–60)
LDLC SERPL CALC-MCNC: 64 MG/DL (ref 50–140)
POTASSIUM SERPL-SCNC: 4 MMOL/L (ref 3.5–5.1)
PROT SERPL-MCNC: 7.2 GM/DL (ref 6.4–8.3)
SODIUM SERPL-SCNC: 138 MMOL/L (ref 136–145)
TRIGL SERPL-MCNC: 199 MG/DL (ref 37–140)
VLDLC SERPL CALC-MCNC: 40 MG/DL

## 2023-11-17 PROCEDURE — 36415 COLL VENOUS BLD VENIPUNCTURE: CPT

## 2023-11-17 PROCEDURE — 80053 COMPREHEN METABOLIC PANEL: CPT

## 2023-11-17 PROCEDURE — 80061 LIPID PANEL: CPT

## 2023-11-20 RX ORDER — ONDANSETRON 4 MG/1
4 TABLET, ORALLY DISINTEGRATING ORAL EVERY 6 HOURS PRN
Qty: 30 TABLET | Refills: 3 | Status: SHIPPED | OUTPATIENT
Start: 2023-11-20

## 2023-11-27 ENCOUNTER — OFFICE VISIT (OUTPATIENT)
Dept: FAMILY MEDICINE | Facility: CLINIC | Age: 43
End: 2023-11-27
Payer: MEDICAID

## 2023-11-27 VITALS
HEART RATE: 77 BPM | HEIGHT: 59 IN | DIASTOLIC BLOOD PRESSURE: 74 MMHG | SYSTOLIC BLOOD PRESSURE: 126 MMHG | WEIGHT: 248 LBS | BODY MASS INDEX: 50 KG/M2

## 2023-11-27 DIAGNOSIS — I10 PRIMARY HYPERTENSION: Primary | ICD-10-CM

## 2023-11-27 DIAGNOSIS — M77.12 LATERAL EPICONDYLITIS OF LEFT ELBOW: ICD-10-CM

## 2023-11-27 DIAGNOSIS — G47.33 OBSTRUCTIVE SLEEP APNEA SYNDROME: ICD-10-CM

## 2023-11-27 DIAGNOSIS — F41.1 GENERALIZED ANXIETY DISORDER: ICD-10-CM

## 2023-11-27 DIAGNOSIS — F32.9 MAJOR DEPRESSIVE DISORDER, REMISSION STATUS UNSPECIFIED, UNSPECIFIED WHETHER RECURRENT: ICD-10-CM

## 2023-11-27 DIAGNOSIS — Z23 NEED FOR INFLUENZA VACCINATION: ICD-10-CM

## 2023-11-27 DIAGNOSIS — E78.5 HYPERLIPIDEMIA, UNSPECIFIED HYPERLIPIDEMIA TYPE: ICD-10-CM

## 2023-11-27 DIAGNOSIS — E87.6 HYPOKALEMIA: ICD-10-CM

## 2023-11-27 DIAGNOSIS — Z00.00 ANNUAL PHYSICAL EXAM: ICD-10-CM

## 2023-11-27 PROCEDURE — 99214 PR OFFICE/OUTPT VISIT, EST, LEVL IV, 30-39 MIN: ICD-10-PCS | Mod: 95,25,, | Performed by: NURSE PRACTITIONER

## 2023-11-27 PROCEDURE — 90471 IMMUNIZATION ADMIN: CPT | Mod: NDTC,,, | Performed by: NURSE PRACTITIONER

## 2023-11-27 PROCEDURE — 3078F PR MOST RECENT DIASTOLIC BLOOD PRESSURE < 80 MM HG: ICD-10-PCS | Mod: CPTII,95,, | Performed by: NURSE PRACTITIONER

## 2023-11-27 PROCEDURE — 3074F PR MOST RECENT SYSTOLIC BLOOD PRESSURE < 130 MM HG: ICD-10-PCS | Mod: CPTII,95,, | Performed by: NURSE PRACTITIONER

## 2023-11-27 PROCEDURE — 3078F DIAST BP <80 MM HG: CPT | Mod: CPTII,95,, | Performed by: NURSE PRACTITIONER

## 2023-11-27 PROCEDURE — 3044F PR MOST RECENT HEMOGLOBIN A1C LEVEL <7.0%: ICD-10-PCS | Mod: CPTII,95,, | Performed by: NURSE PRACTITIONER

## 2023-11-27 PROCEDURE — 4010F ACE/ARB THERAPY RXD/TAKEN: CPT | Mod: CPTII,95,, | Performed by: NURSE PRACTITIONER

## 2023-11-27 PROCEDURE — 90686 IIV4 VACC NO PRSV 0.5 ML IM: CPT | Mod: NDTC,,, | Performed by: NURSE PRACTITIONER

## 2023-11-27 PROCEDURE — 99214 OFFICE O/P EST MOD 30 MIN: CPT | Mod: 95,25,, | Performed by: NURSE PRACTITIONER

## 2023-11-27 PROCEDURE — 4010F PR ACE/ARB THEARPY RXD/TAKEN: ICD-10-PCS | Mod: CPTII,95,, | Performed by: NURSE PRACTITIONER

## 2023-11-27 PROCEDURE — 3074F SYST BP LT 130 MM HG: CPT | Mod: CPTII,95,, | Performed by: NURSE PRACTITIONER

## 2023-11-27 PROCEDURE — 1159F MED LIST DOCD IN RCRD: CPT | Mod: CPTII,95,, | Performed by: NURSE PRACTITIONER

## 2023-11-27 PROCEDURE — 3008F PR BODY MASS INDEX (BMI) DOCUMENTED: ICD-10-PCS | Mod: CPTII,95,, | Performed by: NURSE PRACTITIONER

## 2023-11-27 PROCEDURE — 90471 FLU VACCINE (QUAD) GREATER THAN OR EQUAL TO 3YO PRESERVATIVE FREE IM: ICD-10-PCS | Mod: NDTC,,, | Performed by: NURSE PRACTITIONER

## 2023-11-27 PROCEDURE — 3044F HG A1C LEVEL LT 7.0%: CPT | Mod: CPTII,95,, | Performed by: NURSE PRACTITIONER

## 2023-11-27 PROCEDURE — 1160F PR REVIEW ALL MEDS BY PRESCRIBER/CLIN PHARMACIST DOCUMENTED: ICD-10-PCS | Mod: CPTII,95,, | Performed by: NURSE PRACTITIONER

## 2023-11-27 PROCEDURE — 3008F BODY MASS INDEX DOCD: CPT | Mod: CPTII,95,, | Performed by: NURSE PRACTITIONER

## 2023-11-27 PROCEDURE — 1159F PR MEDICATION LIST DOCUMENTED IN MEDICAL RECORD: ICD-10-PCS | Mod: CPTII,95,, | Performed by: NURSE PRACTITIONER

## 2023-11-27 PROCEDURE — 90686 FLU VACCINE (QUAD) GREATER THAN OR EQUAL TO 3YO PRESERVATIVE FREE IM: ICD-10-PCS | Mod: NDTC,,, | Performed by: NURSE PRACTITIONER

## 2023-11-27 PROCEDURE — 1160F RVW MEDS BY RX/DR IN RCRD: CPT | Mod: CPTII,95,, | Performed by: NURSE PRACTITIONER

## 2023-11-27 RX ORDER — POTASSIUM CHLORIDE 750 MG/1
20 CAPSULE, EXTENDED RELEASE ORAL DAILY
Qty: 60 CAPSULE | Refills: 11 | Status: SHIPPED | OUTPATIENT
Start: 2023-11-27

## 2023-11-27 RX ORDER — BENAZEPRIL HYDROCHLORIDE 10 MG/1
10 TABLET ORAL 2 TIMES DAILY
Qty: 60 TABLET | Refills: 11 | Status: SHIPPED | OUTPATIENT
Start: 2023-11-27

## 2023-11-27 RX ORDER — BENAZEPRIL HYDROCHLORIDE 10 MG/1
TABLET ORAL
Qty: 60 TABLET | Refills: 5 | Status: SHIPPED | OUTPATIENT
Start: 2023-11-27 | End: 2023-11-27 | Stop reason: SDUPTHER

## 2023-11-27 RX ORDER — PRAVASTATIN SODIUM 40 MG/1
40 TABLET ORAL DAILY
Qty: 30 TABLET | Refills: 11 | Status: SHIPPED | OUTPATIENT
Start: 2023-11-27

## 2023-11-27 NOTE — PROGRESS NOTES
TELEMEDICINE VISIT     Patient ID: Malia Schroeder is a 43 y.o. female.  MRN: [id number]  : 1980    Subjective:        TELEMEDICINE  The patient location is: home  The chief complaint leading to consultation is: Hypertension (6 month f/u), Depression (6 month f/u), Anxiety (6 month f/u), Hypokalemia (6 month f/u), and Hyperlipidemia (6 month f/u)     Visit type: Virtual visit with synchronous audio and video    Total time spent with patient: 20 minutes  30 minutes of total time spent on the encounter, which includes face to face time and non-face to face time preparing to see the patient (eg, review of tests), obtaining and/or reviewing separately obtained history, documenting clinical information in the electronic or other health record, independently interpreting results (not separately reported) and communicating results to the patient/family/caregiver, or care coordination (not separately reported).    Each patient to whom he or she provides medical services by telemedicine is:  (1) informed of the relationship between the physician and patient and the respective role of any other health care provider with respect to management of the patient; and (2) notified that he or she may decline to receive medical services by telemedicine and may withdraw from such care at any time.    HPI: HPI   This is a 43-year-old white female who was seen today via virtual visit for six-month follow-up for hypertension, hyperlipidemia, depression, anxiety, hypokalemia, lateral epicondylitis of left elbow, sleep apnea.  Patient reports overall she is doing well.  Reports that she did finally get a follow-up with [facility] ortho clinic next month.  Is still having left elbow pain.  No other complaints today.    Health maintenance reviewed with the patient.  Health maintenance completed:  Health Maintenance Topics with due status: Not Due       Topic Last Completion Date    Hemoglobin A1c (Diabetic Prevention Screening) 2023       Health maintenance due:  Health Maintenance Due   Topic Date Due    Influenza Vaccine (1) 09/01/2023      ROS:  Review of Systems   Constitutional:  Negative for activity change and unexpected weight change.   HENT:  Negative for hearing loss, rhinorrhea and trouble swallowing.    Eyes:  Negative for discharge and visual disturbance.   Respiratory:  Negative for chest tightness and wheezing.    Cardiovascular:  Negative for chest pain and palpitations.   Gastrointestinal:  Negative for blood in stool, constipation, diarrhea and vomiting.   Endocrine: Negative for polydipsia and polyuria.   Genitourinary:  Negative for difficulty urinating, dysuria, hematuria and menstrual problem.   Musculoskeletal:  Negative for arthralgias, joint swelling and neck pain.   Neurological:  Negative for weakness and headaches.   Psychiatric/Behavioral:  Negative for confusion and dysphoric mood.       Complete ROS negative except as stated in HPI  History:     Past Medical History:   Diagnosis Date    Allergies     Anxiety disorder, unspecified     Chronic sinusitis, unspecified     Depression     GERD (gastroesophageal reflux disease)     Hyperlipidemia     Hypertension     Hypokalemia     Iron deficiency anemia, unspecified     Mild intermittent asthma, uncomplicated     Mild intermittent asthma, uncomplicated     Obstructive sleep apnea     Peripheral edema       Past Surgical History:   Procedure Laterality Date    CHOLECYSTECTOMY      HYSTERECTOMY      PLANTAR FASCIA RELEASE      SINUS SURGERY      TARSAL TUNNEL RELEASE      TONSILLECTOMY       Family History   Problem Relation Age of Onset    Hypertension Mother     Dementia Mother     Arthritis Mother     Cancer Father         Non hodgekins lymphoma      Social History     Tobacco Use    Smoking status: Some Days     Current packs/day: 0.00     Types: Cigarettes    Smokeless tobacco: Never   Substance and Sexual Activity    Alcohol use: Not Currently     Comment: socially  "   Drug use: Never    Sexual activity: Not Currently          Allergies:   Review of patient's allergies indicates:   Allergen Reactions    Hydrocodone-acetaminophen Hives    Promethazine Anxiety     Objective:     Vitals:    11/27/23 1005   BP: 126/74   Pulse: 77   Weight: 112.5 kg (248 lb)   Height: 4' 11" (1.499 m)   PainSc: 10-Worst pain ever   PainLoc: Elbow         Physical Examination:   Physical Exam  Vitals and nursing note reviewed.   Constitutional:       Appearance: Normal appearance.   HENT:      Head: Normocephalic and atraumatic.   Neurological:      General: No focal deficit present.      Mental Status: She is alert and oriented to person, place, and time.   Psychiatric:         Mood and Affect: Mood normal.         Behavior: Behavior normal.         Thought Content: Thought content normal.         Judgment: Judgment normal.           Medications:     Medication List with Changes/Refills   Current Medications    ALBUTEROL-IPRATROPIUM (DUO-NEB) 2.5 MG-0.5 MG/3 ML NEBULIZER SOLUTION    Inhale 3 mLs into the lungs.    ALPRAZOLAM (XANAX) 1 MG TABLET    Take 1 tablet (1 mg total) by mouth 2 (two) times daily.    BENAZEPRIL (LOTENSIN) 10 MG TABLET    TAKE 1 TABLET BY MOUTH TWICE A DAY FOR BLOOD PRESSURE    BUSPIRONE (BUSPAR) 15 MG TABLET    Take 1 tablet (15 mg total) by mouth 2 (two) times daily.    MELOXICAM (MOBIC) 15 MG TABLET    Take 1 tablet (15 mg total) by mouth once daily.    MONTELUKAST (SINGULAIR) 10 MG TABLET    TAKE ONE TABLET BY MOUTH EVERY DAY FOR ALLERGIES AND ASTHMA    NIFEDIPINE (PROCARDIA-XL) 60 MG (OSM) 24 HR TABLET        ONDANSETRON (ZOFRAN-ODT) 4 MG TBDL    Take 1 tablet (4 mg total) by mouth every 6 (six) hours as needed (nausea).    PANTOPRAZOLE (PROTONIX) 40 MG TABLET    TAKE 1 TABLET BY MOUTH TWICE DAILY FOR ACID REFLUX.    SPIRONOLACTONE (ALDACTONE) 50 MG TABLET    TAKE 1 TABLET BY MOUTH ONCE DAILY.    VILAZODONE (VIIBRYD) 40 MG TAB TABLET    TAKE 1 TABLET (40 MG TOTAL) BY MOUTH " ONCE DAILY FOR DEPRESSION/MOOD   Changed and/or Refilled Medications    Modified Medication Previous Medication    POTASSIUM CHLORIDE (MICRO-K) 10 MEQ CPSR potassium chloride (MICRO-K) 10 MEQ CpSR       Take 2 capsules (20 mEq total) by mouth once daily.    TAKE TWO CAPSULES BY MOUTH TWICE A DAY    PRAVASTATIN (PRAVACHOL) 40 MG TABLET pravastatin (PRAVACHOL) 40 MG tablet       Take 1 tablet (40 mg total) by mouth once daily.    Take 40 mg by mouth once daily.     Assessment and Plan       ICD-10-CM ICD-9-CM   1. Primary hypertension  I10 401.9   2. Hyperlipidemia, unspecified hyperlipidemia type  E78.5 272.4   3. Need for influenza vaccination  Z23 V04.81   4. Major depressive disorder, remission status unspecified, unspecified whether recurrent  F32.9 296.20   5. Generalized anxiety disorder  F41.1 300.02   6. Hypokalemia  E87.6 276.8   7. Lateral epicondylitis of left elbow  M77.12 726.32   8. Obstructive sleep apnea syndrome  G47.33 327.23   9. Annual physical exam  Z00.00 V70.0     1. Primary hypertension  Overview:  DX by previous provider, initially on benazepril 10 mg daily   09/17/2021 - benazepril 10 mg BID  11/17/2021 - add nifedipine 30 mg daily  11/22/2021 - increase nifedipine to 60 mg daily   12/02/2021 - spironolactone 50 mg daily added per Dr Saldivar      Assessment & Plan:  Stable, continue benazepril, nifedipine, spironolactone.  Follow-up 6 months with wellness.      2. Hyperlipidemia, unspecified hyperlipidemia type  Overview:  Pravastatin 40 mg daily    Assessment & Plan:  Stable, total cholesterol 134, LDL 64.  Continue pravastatin 40 mg daily, follow-up 6 months with wellness.    Orders:  -     pravastatin (PRAVACHOL) 40 MG tablet; Take 1 tablet (40 mg total) by mouth once daily.  Dispense: 30 tablet; Refill: 11    3. Need for influenza vaccination  Comments:  Patient will drop by clinic this afternoon for flu vaccine  Orders:  -     Influenza - Quadrivalent *Preferred* (6 months+) (PF)    4.  Major depressive disorder, remission status unspecified, unspecified whether recurrent  Overview:  Effexor - did not work, made her feel worse  Zoloft > 10 years, DC on 10/15/2021  10/15/2021 - DC Zoloft 200 mg daily, start Viibryd 20 mg daily   02/03/2022 - Viibryd 40 mg daily     Assessment & Plan:  Stable, continue Viibryd 40 mg daily, follow-up 6 months with wellness.      5. Generalized anxiety disorder  Overview:  Xanax 1 mg BID prn     Effexor - did not work, made her feel worse  Zoloft > 10 years, DC on 10/15/2021  10/15/2021 - DC Zoloft 200 mg daily, start Viibryd 20 mg daily   02/03/2022 - Viibryd 40 mg daily   03/09/2023 - add BuSpar 15 mg twice daily    Assessment & Plan:  Stable, continue Viibryd daily, BuSpar twice daily, Xanax as needed.  Follow-up 6 months with wellness.      6. Hypokalemia  Overview:  Potassium chloride (Micro-K) 10 mEq cap once daily  Spironolactone 50 mg daily     Assessment & Plan:  Stable, potassium 4.0.  Continue Micro-K 10 mEq 1 capsule daily.    Orders:  -     potassium chloride (MICRO-K) 10 MEQ CpSR; Take 2 capsules (20 mEq total) by mouth once daily.  Dispense: 60 capsule; Refill: 11    7. Lateral epicondylitis of left elbow  Assessment & Plan:  Follow-up with [facility] orthopedic clinic next month as scheduled.      8. Obstructive sleep apnea syndrome  Overview:  HST per [facility] 07/25/2017- AHI 46  Previously managed by Keiko Rutherford NP  AutoPap 5-20 cm H2O    Assessment & Plan:  Reports nightly compliance with auto PAP.  Recommend continued auto PAP therapy at 5-20 cm H2O.      9. Annual physical exam  -     CBC Auto Differential; Future; Expected date: 05/27/2024  -     Comprehensive Metabolic Panel; Future; Expected date: 05/27/2024  -     Lipid Panel; Future; Expected date: 05/27/2024  -     TSH; Future; Expected date: 05/27/2024  -     Hemoglobin A1C; Future; Expected date: 05/27/2024              Follow Up:   Follow up in about 6  months (around 5/27/2024) for Annual.    I spent greater than 30 minutes today both in chart review and greater than 50% of that time in discussion with the patient regarding health maintenance, diagnoses, diagnostic tests, medications, treatments, symptom management, expected results and adverse effects. Patient verbalized understanding and all questions were answered.

## 2023-11-27 NOTE — ASSESSMENT & PLAN NOTE
Stable, continue Viibryd daily, BuSpar twice daily, Xanax as needed.  Follow-up 6 months with wellness.    11/28/2023 -  patient called back stating that she feels like her anxiety really is not that well-controlled, increase BuSpar to 30 mg twice a day.

## 2023-11-27 NOTE — ASSESSMENT & PLAN NOTE
Stable, total cholesterol 134, LDL 64.  Continue pravastatin 40 mg daily, follow-up 6 months with wellness.

## 2023-11-28 RX ORDER — BUSPIRONE HYDROCHLORIDE 30 MG/1
30 TABLET ORAL 2 TIMES DAILY
Qty: 60 TABLET | Refills: 11 | Status: SHIPPED | OUTPATIENT
Start: 2023-11-28 | End: 2024-02-07 | Stop reason: SDUPTHER

## 2023-12-04 DIAGNOSIS — F41.1 GENERALIZED ANXIETY DISORDER: ICD-10-CM

## 2023-12-04 RX ORDER — ALPRAZOLAM 1 MG/1
1 TABLET ORAL 2 TIMES DAILY
Qty: 60 TABLET | Refills: 2 | Status: SHIPPED | OUTPATIENT
Start: 2023-12-04 | End: 2024-02-26

## 2023-12-11 DIAGNOSIS — K21.9 GASTROESOPHAGEAL REFLUX DISEASE, UNSPECIFIED WHETHER ESOPHAGITIS PRESENT: ICD-10-CM

## 2023-12-11 RX ORDER — PANTOPRAZOLE SODIUM 40 MG/1
TABLET, DELAYED RELEASE ORAL
Qty: 60 TABLET | Refills: 5 | Status: SHIPPED | OUTPATIENT
Start: 2023-12-11

## 2023-12-18 DIAGNOSIS — J32.0 CHRONIC MAXILLARY SINUSITIS: ICD-10-CM

## 2023-12-18 RX ORDER — MONTELUKAST SODIUM 10 MG/1
10 TABLET ORAL NIGHTLY
Qty: 30 TABLET | Refills: 11 | Status: SHIPPED | OUTPATIENT
Start: 2023-12-18

## 2024-02-07 ENCOUNTER — TELEPHONE (OUTPATIENT)
Dept: FAMILY MEDICINE | Facility: CLINIC | Age: 44
End: 2024-02-07
Payer: MEDICAID

## 2024-02-07 DIAGNOSIS — F41.1 GENERALIZED ANXIETY DISORDER: ICD-10-CM

## 2024-02-07 RX ORDER — BUSPIRONE HYDROCHLORIDE 30 MG/1
30 TABLET ORAL 3 TIMES DAILY
Qty: 90 TABLET | Refills: 11 | Status: SHIPPED | OUTPATIENT
Start: 2024-02-07 | End: 2025-02-06

## 2024-02-07 NOTE — TELEPHONE ENCOUNTER
1. Generalized anxiety disorder  Overview:  Xanax 1 mg BID prn     Effexor - did not work, made her feel worse  Zoloft > 10 years, DC on 10/15/2021  10/15/2021 - DC Zoloft 200 mg daily, start Viibryd 20 mg daily   02/03/2022 - Viibryd 40 mg daily   03/09/2023 - add BuSpar 15 mg twice daily  11/28/2023 - BuSpar 30 mg twice daily  02/07/2024 - BuSpar 30 mg 3 times daily    Orders:  -     busPIRone (BUSPAR) 30 MG Tab; Take 1 tablet (30 mg total) by mouth 3 (three) times daily.  Dispense: 90 tablet; Refill: 11

## 2024-02-23 DIAGNOSIS — F41.1 GENERALIZED ANXIETY DISORDER: ICD-10-CM

## 2024-02-26 RX ORDER — ALPRAZOLAM 1 MG/1
TABLET ORAL
Qty: 60 TABLET | Refills: 2 | Status: SHIPPED | OUTPATIENT
Start: 2024-02-26 | End: 2024-06-04

## 2024-02-26 RX ORDER — NIFEDIPINE 60 MG/1
TABLET, EXTENDED RELEASE ORAL
Qty: 30 TABLET | Refills: 11 | Status: SHIPPED | OUTPATIENT
Start: 2024-02-26

## 2024-03-14 ENCOUNTER — TELEPHONE (OUTPATIENT)
Dept: FAMILY MEDICINE | Facility: CLINIC | Age: 44
End: 2024-03-14
Payer: MEDICAID

## 2024-03-14 RX ORDER — TIZANIDINE 4 MG/1
4 TABLET ORAL EVERY 6 HOURS PRN
Qty: 30 TABLET | Refills: 3 | Status: SHIPPED | OUTPATIENT
Start: 2024-03-14

## 2024-03-14 NOTE — TELEPHONE ENCOUNTER
Patient pulled a muscle in her upper back/shoulder on a cruise last week.  Was feeling better but her mom fell today and she was helping her up off the floor and re irritated the injury.  She has some Toradol and tramadol at home but no muscle relaxers.  Will send her in a prescription of tizanidine to take as needed, she can use the Toradol that she has at home.  She can take tramadol only as needed for the severe pain if it does not get better with the muscle relaxer and Toradol.  Patient notified.

## 2024-05-08 RX ORDER — MUPIROCIN 20 MG/G
OINTMENT TOPICAL 2 TIMES DAILY
Qty: 22 G | Refills: 11 | Status: SHIPPED | OUTPATIENT
Start: 2024-05-08

## 2024-05-15 DIAGNOSIS — F51.01 PRIMARY INSOMNIA: Primary | ICD-10-CM

## 2024-05-15 RX ORDER — TRAZODONE HYDROCHLORIDE 100 MG/1
100 TABLET ORAL NIGHTLY
Qty: 30 TABLET | Refills: 11 | Status: SHIPPED | OUTPATIENT
Start: 2024-05-15 | End: 2024-05-29

## 2024-05-22 ENCOUNTER — TELEPHONE (OUTPATIENT)
Dept: FAMILY MEDICINE | Facility: CLINIC | Age: 44
End: 2024-05-22
Payer: MEDICAID

## 2024-05-22 NOTE — TELEPHONE ENCOUNTER
Are there any outstanding tasks in patient's chart?    labs  2. Do we have outstanding/pending referrals?    n  3. Has the patient been seen in an ER, Urgent Care, or admitted since last visit?    n  4. Has patient seen any other health care providers since last visit?    n  5.  Has patient had any blood work or x-rays done since last visit?   Patient will complete labs prior to visit

## 2024-05-24 ENCOUNTER — LAB VISIT (OUTPATIENT)
Dept: LAB | Facility: HOSPITAL | Age: 44
End: 2024-05-24
Attending: NURSE PRACTITIONER
Payer: MEDICAID

## 2024-05-24 DIAGNOSIS — Z00.00 ANNUAL PHYSICAL EXAM: ICD-10-CM

## 2024-05-24 LAB
ALBUMIN SERPL-MCNC: 3.6 G/DL (ref 3.5–5)
ALBUMIN/GLOB SERPL: 1 RATIO (ref 1.1–2)
ALP SERPL-CCNC: 152 UNIT/L (ref 40–150)
ALT SERPL-CCNC: 24 UNIT/L (ref 0–55)
ANION GAP SERPL CALC-SCNC: 11 MEQ/L
AST SERPL-CCNC: 17 UNIT/L (ref 5–34)
BASOPHILS # BLD AUTO: 0.01 X10(3)/MCL
BASOPHILS NFR BLD AUTO: 0.1 %
BILIRUB SERPL-MCNC: 0.4 MG/DL
BUN SERPL-MCNC: 23.1 MG/DL (ref 7–18.7)
CALCIUM SERPL-MCNC: 9.6 MG/DL (ref 8.4–10.2)
CHLORIDE SERPL-SCNC: 105 MMOL/L (ref 98–107)
CHOLEST SERPL-MCNC: 159 MG/DL
CHOLEST/HDLC SERPL: 5 {RATIO} (ref 0–5)
CO2 SERPL-SCNC: 26 MMOL/L (ref 22–29)
CREAT SERPL-MCNC: 0.86 MG/DL (ref 0.55–1.02)
CREAT/UREA NIT SERPL: 27
EOSINOPHIL # BLD AUTO: 0.25 X10(3)/MCL (ref 0–0.9)
EOSINOPHIL NFR BLD AUTO: 3 %
ERYTHROCYTE [DISTWIDTH] IN BLOOD BY AUTOMATED COUNT: 13.5 % (ref 11.5–17)
EST. AVERAGE GLUCOSE BLD GHB EST-MCNC: 91.1 MG/DL
GFR SERPLBLD CREATININE-BSD FMLA CKD-EPI: >60 ML/MIN/1.73/M2
GLOBULIN SER-MCNC: 3.7 GM/DL (ref 2.4–3.5)
GLUCOSE SERPL-MCNC: 102 MG/DL (ref 74–100)
HBA1C MFR BLD: 4.8 %
HCT VFR BLD AUTO: 38.9 % (ref 37–47)
HDLC SERPL-MCNC: 31 MG/DL (ref 35–60)
HGB BLD-MCNC: 12.7 G/DL (ref 12–16)
IMM GRANULOCYTES # BLD AUTO: 0.04 X10(3)/MCL (ref 0–0.04)
IMM GRANULOCYTES NFR BLD AUTO: 0.5 %
LDLC SERPL CALC-MCNC: 57 MG/DL (ref 50–140)
LYMPHOCYTES # BLD AUTO: 2.1 X10(3)/MCL (ref 0.6–4.6)
LYMPHOCYTES NFR BLD AUTO: 25.1 %
MCH RBC QN AUTO: 29.6 PG (ref 27–31)
MCHC RBC AUTO-ENTMCNC: 32.6 G/DL (ref 33–36)
MCV RBC AUTO: 90.7 FL (ref 80–94)
MONOCYTES # BLD AUTO: 0.42 X10(3)/MCL (ref 0.1–1.3)
MONOCYTES NFR BLD AUTO: 5 %
NEUTROPHILS # BLD AUTO: 5.56 X10(3)/MCL (ref 2.1–9.2)
NEUTROPHILS NFR BLD AUTO: 66.3 %
PLATELET # BLD AUTO: 240 X10(3)/MCL (ref 130–400)
PMV BLD AUTO: 8.8 FL (ref 7.4–10.4)
POTASSIUM SERPL-SCNC: 3.9 MMOL/L (ref 3.5–5.1)
PROT SERPL-MCNC: 7.3 GM/DL (ref 6.4–8.3)
RBC # BLD AUTO: 4.29 X10(6)/MCL (ref 4.2–5.4)
SODIUM SERPL-SCNC: 142 MMOL/L (ref 136–145)
TRIGL SERPL-MCNC: 353 MG/DL (ref 37–140)
TSH SERPL-ACNC: 0.97 UIU/ML (ref 0.35–4.94)
VLDLC SERPL CALC-MCNC: 71 MG/DL
WBC # SPEC AUTO: 8.38 X10(3)/MCL (ref 4.5–11.5)

## 2024-05-24 PROCEDURE — 80053 COMPREHEN METABOLIC PANEL: CPT

## 2024-05-24 PROCEDURE — 83036 HEMOGLOBIN GLYCOSYLATED A1C: CPT

## 2024-05-24 PROCEDURE — 85025 COMPLETE CBC W/AUTO DIFF WBC: CPT

## 2024-05-24 PROCEDURE — 80061 LIPID PANEL: CPT

## 2024-05-24 PROCEDURE — 84443 ASSAY THYROID STIM HORMONE: CPT

## 2024-05-24 PROCEDURE — 36415 COLL VENOUS BLD VENIPUNCTURE: CPT

## 2024-05-29 ENCOUNTER — OFFICE VISIT (OUTPATIENT)
Dept: FAMILY MEDICINE | Facility: CLINIC | Age: 44
End: 2024-05-29
Payer: MEDICAID

## 2024-05-29 VITALS
OXYGEN SATURATION: 99 % | WEIGHT: 240 LBS | RESPIRATION RATE: 16 BRPM | TEMPERATURE: 98 F | SYSTOLIC BLOOD PRESSURE: 132 MMHG | DIASTOLIC BLOOD PRESSURE: 82 MMHG | BODY MASS INDEX: 48.38 KG/M2 | HEIGHT: 59 IN | HEART RATE: 94 BPM

## 2024-05-29 DIAGNOSIS — I10 PRIMARY HYPERTENSION: ICD-10-CM

## 2024-05-29 DIAGNOSIS — G47.33 OBSTRUCTIVE SLEEP APNEA SYNDROME: ICD-10-CM

## 2024-05-29 DIAGNOSIS — Z00.00 ANNUAL PHYSICAL EXAM: Primary | ICD-10-CM

## 2024-05-29 DIAGNOSIS — F41.1 GENERALIZED ANXIETY DISORDER: ICD-10-CM

## 2024-05-29 DIAGNOSIS — F51.01 PRIMARY INSOMNIA: ICD-10-CM

## 2024-05-29 DIAGNOSIS — Z12.31 BREAST CANCER SCREENING BY MAMMOGRAM: ICD-10-CM

## 2024-05-29 DIAGNOSIS — E78.2 MIXED HYPERLIPIDEMIA: ICD-10-CM

## 2024-05-29 DIAGNOSIS — F33.9 RECURRENT MAJOR DEPRESSIVE DISORDER, REMISSION STATUS UNSPECIFIED: ICD-10-CM

## 2024-05-29 PROCEDURE — 3079F DIAST BP 80-89 MM HG: CPT | Mod: CPTII,,, | Performed by: NURSE PRACTITIONER

## 2024-05-29 PROCEDURE — 1159F MED LIST DOCD IN RCRD: CPT | Mod: CPTII,,, | Performed by: NURSE PRACTITIONER

## 2024-05-29 PROCEDURE — 3075F SYST BP GE 130 - 139MM HG: CPT | Mod: CPTII,,, | Performed by: NURSE PRACTITIONER

## 2024-05-29 PROCEDURE — 99396 PREV VISIT EST AGE 40-64: CPT | Mod: ,,, | Performed by: NURSE PRACTITIONER

## 2024-05-29 PROCEDURE — 3044F HG A1C LEVEL LT 7.0%: CPT | Mod: CPTII,,, | Performed by: NURSE PRACTITIONER

## 2024-05-29 PROCEDURE — 1160F RVW MEDS BY RX/DR IN RCRD: CPT | Mod: CPTII,,, | Performed by: NURSE PRACTITIONER

## 2024-05-29 PROCEDURE — 4010F ACE/ARB THERAPY RXD/TAKEN: CPT | Mod: CPTII,,, | Performed by: NURSE PRACTITIONER

## 2024-05-29 PROCEDURE — 3008F BODY MASS INDEX DOCD: CPT | Mod: CPTII,,, | Performed by: NURSE PRACTITIONER

## 2024-05-29 RX ORDER — BENAZEPRIL HYDROCHLORIDE 10 MG/1
10 TABLET ORAL 2 TIMES DAILY
Qty: 60 TABLET | Refills: 11 | Status: SHIPPED | OUTPATIENT
Start: 2024-05-29

## 2024-05-29 RX ORDER — ZOLPIDEM TARTRATE 5 MG/1
5 TABLET ORAL NIGHTLY PRN
Qty: 30 TABLET | Refills: 2 | Status: SHIPPED | OUTPATIENT
Start: 2024-05-29 | End: 2024-11-27

## 2024-05-29 NOTE — ASSESSMENT & PLAN NOTE
Discontinue the trazodone, did not work and made her dizzy in the morning.  Trial of Ambien 5 mg at bedtime.  Follow-up in 6 months or sooner if needed.

## 2024-05-29 NOTE — ASSESSMENT & PLAN NOTE
Total cholesterol 159, HDL 31, triglycerides 353, LDL 57.  Discussed low triglyceride diet, follow-up 1 year.

## 2024-05-29 NOTE — PROGRESS NOTES
Subjective:       Patient ID: Malia Schroeder is a 44 y.o. female.    Chief Complaint: Annual Exam      HPI   This is a 44-year-old white female who presents to clinic today for an annual wellness exam.  Patient reports that she has still having a lot of trouble sleeping.  Sometimes she falls asleep but she is waking up between 1 and 3 in the morning and not able to fall back asleep.  The trazodone made her dizzy and drowsy in the morning.  Review of Systems  Comprehensive review of systems negative except as stated in HPI    The patient's Health Maintenance was reviewed and the following appears to be due:   There are no preventive care reminders to display for this patient.    Past Medical History:  Past Medical History:   Diagnosis Date    Allergies     Anxiety disorder, unspecified     Chronic sinusitis, unspecified     Depression     GERD (gastroesophageal reflux disease)     Hyperlipidemia     Hypertension     Hypokalemia     Iron deficiency anemia, unspecified     Mild intermittent asthma, uncomplicated     Mild intermittent asthma, uncomplicated     Obstructive sleep apnea     Peripheral edema      Past Surgical History:   Procedure Laterality Date    CHOLECYSTECTOMY      HYSTERECTOMY      PLANTAR FASCIA RELEASE      SINUS SURGERY      TARSAL TUNNEL RELEASE      TONSILLECTOMY       Review of patient's allergies indicates:   Allergen Reactions    Hydrocodone-acetaminophen Hives    Promethazine Anxiety     Current Outpatient Medications on File Prior to Visit   Medication Sig Dispense Refill    albuterol-ipratropium (DUO-NEB) 2.5 mg-0.5 mg/3 mL nebulizer solution Inhale 3 mLs into the lungs.      ALPRAZolam (XANAX) 1 MG tablet TAKE 1 TABLET BY MOUTH TWICE A DAY FOR ANXIETY 60 tablet 2    busPIRone (BUSPAR) 30 MG Tab Take 1 tablet (30 mg total) by mouth 3 (three) times daily. 90 tablet 11    meloxicam (MOBIC) 15 MG tablet Take 1 tablet (15 mg total) by mouth once daily. 30 tablet 11    montelukast  (SINGULAIR) 10 mg tablet Take 1 tablet (10 mg total) by mouth every evening. 30 tablet 11    mupirocin (BACTROBAN) 2 % ointment Apply topically 2 (two) times daily. 22 g 11    NIFEdipine (PROCARDIA-XL) 60 MG (OSM) 24 hr tablet TAKE 1 TABLET (60 MG TOTAL) BY MOUTH ONCE DAILY FOR BLOOD PRESSURE 30 tablet 11    ondansetron (ZOFRAN-ODT) 4 MG TbDL Take 1 tablet (4 mg total) by mouth every 6 (six) hours as needed (nausea). 30 tablet 3    pantoprazole (PROTONIX) 40 MG tablet TAKE 1 TABLET BY MOUTH TWICE DAILY FOR ACID REFLUX. 60 tablet 5    potassium chloride (MICRO-K) 10 MEQ CpSR Take 2 capsules (20 mEq total) by mouth once daily. 60 capsule 11    pravastatin (PRAVACHOL) 40 MG tablet Take 1 tablet (40 mg total) by mouth once daily. 30 tablet 11    spironolactone (ALDACTONE) 50 MG tablet TAKE 1 TABLET BY MOUTH ONCE DAILY. 30 tablet 11    tiZANidine (ZANAFLEX) 4 MG tablet Take 1 tablet (4 mg total) by mouth every 6 (six) hours as needed (muscle spasm). 30 tablet 3    vilazodone (VIIBRYD) 40 mg Tab tablet TAKE 1 TABLET (40 MG TOTAL) BY MOUTH ONCE DAILY FOR DEPRESSION/MOOD 30 tablet 11    [DISCONTINUED] benazepriL (LOTENSIN) 10 MG tablet Take 1 tablet (10 mg total) by mouth 2 (two) times daily. FOR BLOOD PRESSURE 60 tablet 11    [DISCONTINUED] traZODone (DESYREL) 100 MG tablet Take 1 tablet (100 mg total) by mouth every evening. (Patient not taking: Reported on 5/29/2024) 30 tablet 11     No current facility-administered medications on file prior to visit.     Social History     Socioeconomic History    Marital status: Single   Tobacco Use    Smoking status: Some Days     Current packs/day: 0.00     Types: Cigarettes    Smokeless tobacco: Never   Substance and Sexual Activity    Alcohol use: Not Currently     Comment: socially    Drug use: Never    Sexual activity: Not Currently     Social Determinants of Health     Financial Resource Strain: Low Risk  (11/27/2023)    Overall Financial Resource Strain (CARDIA)     Difficulty  "of Paying Living Expenses: Not hard at all   Food Insecurity: No Food Insecurity (11/27/2023)    Hunger Vital Sign     Worried About Running Out of Food in the Last Year: Never true     Ran Out of Food in the Last Year: Never true   Transportation Needs: No Transportation Needs (11/27/2023)    PRAPARE - Transportation     Lack of Transportation (Medical): No     Lack of Transportation (Non-Medical): No   Physical Activity: Inactive (11/27/2023)    Exercise Vital Sign     Days of Exercise per Week: 0 days     Minutes of Exercise per Session: 0 min   Stress: Stress Concern Present (11/27/2023)    [employer] - Occupational Stress Questionnaire     Feeling of Stress : To some extent   Housing Stability: Low Risk  (11/27/2023)    Housing Stability Vital Sign     Unable to Pay for Housing in the Last Year: No     Number of Places Lived in the Last Year: 1     Unstable Housing in the Last Year: No     Family History   Problem Relation Name Age of Onset    Hypertension Mother Keyla Schroeder     Dementia Mother Keyla Schroeder     Arthritis Mother Keyla Schroeder     Cancer Father Sami Schroeder         Non hodgekins lymphoma       Objective:       /82 (BP Location: Left arm)   Pulse 94   Temp 97.8 °F (36.6 °C) (Oral)   Resp 16   Ht 4' 11" (1.499 m)   Wt 108.9 kg (240 lb)   SpO2 99%   BMI 48.47 kg/m²      Physical Exam  Vitals and nursing note reviewed.   Constitutional:       Appearance: Normal appearance. She is obese.   HENT:      Head: Normocephalic and atraumatic.      Right Ear: Tympanic membrane, ear canal and external ear normal.      Left Ear: Tympanic membrane, ear canal and external ear normal.      Nose: Nose normal.      Mouth/Throat:      Mouth: Mucous membranes are moist.      Pharynx: Oropharynx is clear.   Eyes:      Extraocular Movements: Extraocular movements intact.      Conjunctiva/sclera: Conjunctivae normal.      Pupils: Pupils are equal, round, and reactive to light. "   Cardiovascular:      Rate and Rhythm: Normal rate and regular rhythm.      Heart sounds: Normal heart sounds.   Pulmonary:      Effort: Pulmonary effort is normal.      Breath sounds: Normal breath sounds.   Abdominal:      General: Abdomen is flat. Bowel sounds are normal.      Palpations: Abdomen is soft.   Musculoskeletal:         General: Normal range of motion.      Cervical back: Normal range of motion and neck supple.   Skin:     General: Skin is warm and dry.   Neurological:      General: No focal deficit present.      Mental Status: She is alert and oriented to person, place, and time.   Psychiatric:         Mood and Affect: Mood normal.         Behavior: Behavior normal.         Thought Content: Thought content normal.         Judgment: Judgment normal.         Labs  Lab Visit on 05/24/2024   Component Date Value Ref Range Status    Sodium 05/24/2024 142  136 - 145 mmol/L Final    Potassium 05/24/2024 3.9  3.5 - 5.1 mmol/L Final    Chloride 05/24/2024 105  98 - 107 mmol/L Final    CO2 05/24/2024 26  22 - 29 mmol/L Final    Glucose 05/24/2024 102 (H)  74 - 100 mg/dL Final    Blood Urea Nitrogen 05/24/2024 23.1 (H)  7.0 - 18.7 mg/dL Final    Creatinine 05/24/2024 0.86  0.55 - 1.02 mg/dL Final    Calcium 05/24/2024 9.6  8.4 - 10.2 mg/dL Final    Protein Total 05/24/2024 7.3  6.4 - 8.3 gm/dL Final    Albumin 05/24/2024 3.6  3.5 - 5.0 g/dL Final    Globulin 05/24/2024 3.7 (H)  2.4 - 3.5 gm/dL Final    Albumin/Globulin Ratio 05/24/2024 1.0 (L)  1.1 - 2.0 ratio Final    Bilirubin Total 05/24/2024 0.4  <=1.5 mg/dL Final    ALP 05/24/2024 152 (H)  40 - 150 unit/L Final    ALT 05/24/2024 24  0 - 55 unit/L Final    AST 05/24/2024 17  5 - 34 unit/L Final    eGFR 05/24/2024 >60  mL/min/1.73/m2 Final    Anion Gap 05/24/2024 11.0  mEq/L Final    BUN/Creatinine Ratio 05/24/2024 27   Final    Cholesterol Total 05/24/2024 159  <=200 mg/dL Final    HDL Cholesterol 05/24/2024 31 (L)  35 - 60 mg/dL Final    Triglyceride  05/24/2024 353 (H)  37 - 140 mg/dL Final    Cholesterol/HDL Ratio 05/24/2024 5  0 - 5 Final    Very Low Density Lipoprotein 05/24/2024 71   Final    LDL Cholesterol 05/24/2024 57.00  50.00 - 140.00 mg/dL Final    TSH 05/24/2024 0.967  0.350 - 4.940 uIU/mL Final    Hemoglobin A1c 05/24/2024 4.8  <=7.0 % Final    Estimated Average Glucose 05/24/2024 91.1  mg/dL Final    WBC 05/24/2024 8.38  4.50 - 11.50 x10(3)/mcL Final    RBC 05/24/2024 4.29  4.20 - 5.40 x10(6)/mcL Final    Hgb 05/24/2024 12.7  12.0 - 16.0 g/dL Final    Hct 05/24/2024 38.9  37.0 - 47.0 % Final    MCV 05/24/2024 90.7  80.0 - 94.0 fL Final    MCH 05/24/2024 29.6  27.0 - 31.0 pg Final    MCHC 05/24/2024 32.6 (L)  33.0 - 36.0 g/dL Final    RDW 05/24/2024 13.5  11.5 - 17.0 % Final    Platelet 05/24/2024 240  130 - 400 x10(3)/mcL Final    MPV 05/24/2024 8.8  7.4 - 10.4 fL Final    Neut % 05/24/2024 66.3  % Final    Lymph % 05/24/2024 25.1  % Final    Mono % 05/24/2024 5.0  % Final    Eos % 05/24/2024 3.0  % Final    Basophil % 05/24/2024 0.1  % Final    Lymph # 05/24/2024 2.10  0.6 - 4.6 x10(3)/mcL Final    Neut # 05/24/2024 5.56  2.1 - 9.2 x10(3)/mcL Final    Mono # 05/24/2024 0.42  0.1 - 1.3 x10(3)/mcL Final    Eos # 05/24/2024 0.25  0 - 0.9 x10(3)/mcL Final    Baso # 05/24/2024 0.01  <=0.2 x10(3)/mcL Final    IG# 05/24/2024 0.04  0 - 0.04 x10(3)/mcL Final    IG% 05/24/2024 0.5  % Final       Assessment and Plan       ICD-10-CM ICD-9-CM   1. Annual physical exam  Z00.00 V70.0   2. Primary insomnia  F51.01 307.42   3. Recurrent major depressive disorder, remission status unspecified  F33.9 296.30   4. Generalized anxiety disorder  F41.1 300.02   5. Primary hypertension  I10 401.9   6. Mixed hyperlipidemia  E78.2 272.2   7. Breast cancer screening by mammogram  Z12.31 V76.12   8. Obstructive sleep apnea syndrome  G47.33 327.23        1. Annual physical exam  Overview:  Annual exam yearly in May    Assessment & Plan:  Labs reviewed in detail with patient,  will repeat in 1 year.    Orders:  -     CBC Auto Differential; Future; Expected date: 05/29/2025  -     Comprehensive Metabolic Panel; Future; Expected date: 05/29/2025  -     Lipid Panel; Future; Expected date: 05/29/2025  -     TSH; Future; Expected date: 05/29/2025  -     Hemoglobin A1C; Future; Expected date: 05/29/2025    2. Primary insomnia  Overview:  05/16/2024 - trial trazodone 100 mg at bedtime   05/29/2024 - discontinue trazodone, trial Ambien 5 mg at bedtime    Assessment & Plan:  Discontinue the trazodone, did not work and made her dizzy in the morning.  Trial of Ambien 5 mg at bedtime.  Follow-up in 6 months or sooner if needed.    Orders:  -     zolpidem (AMBIEN) 5 MG Tab; Take 1 tablet (5 mg total) by mouth nightly as needed (insomnia).  Dispense: 30 tablet; Refill: 2    3. Recurrent major depressive disorder, remission status unspecified  Overview:  Effexor - did not work, made her feel worse  Zoloft > 10 years, DC on 10/15/2021  10/15/2021 - DC Zoloft 200 mg daily, start Viibryd 20 mg daily   02/03/2022 - Viibryd 40 mg daily     Assessment & Plan:  Stable, continue Viibryd, follow-up 6 months.      4. Generalized anxiety disorder  Overview:  Xanax 1 mg BID prn     Effexor - did not work, made her feel worse  Zoloft > 10 years, DC on 10/15/2021  10/15/2021 - DC Zoloft 200 mg daily, start Viibryd 20 mg daily   02/03/2022 - Viibryd 40 mg daily   03/09/2023 - add BuSpar 15 mg twice daily  11/28/2023 - BuSpar 30 mg twice daily  02/07/2024 - BuSpar 30 mg 3 times daily    Assessment & Plan:  Stable, continue Xanax and BuSpar, follow-up 6 months.      5. Primary hypertension  Overview:  DX by previous provider, initially on benazepril 10 mg daily   09/17/2021 - benazepril 10 mg BID  11/17/2021 - add nifedipine 30 mg daily  11/22/2021 - increase nifedipine to 60 mg daily   12/02/2021 - spironolactone 50 mg daily added per Dr Saldivar      Assessment & Plan:  Stable, continue current meds, follow-up 6  months.      6. Mixed hyperlipidemia  Overview:  Pravastatin 40 mg daily    Assessment & Plan:  Total cholesterol 159, HDL 31, triglycerides 353, LDL 57.  Discussed low triglyceride diet, follow-up 1 year.      7. Breast cancer screening by mammogram  Overview:  Declines mammogram    Assessment & Plan:  Still declines mammogram.      8. Obstructive sleep apnea syndrome  Overview:  HST per [facility] 07/25/2017- AHI 46  Previously managed by Keiko Rutherford NP  AutoPap 5-20 cm H2O    Assessment & Plan:  Reports nightly compliance with auto PAP therapy.  Follow-up 1 year.             Follow up in about 6 months (around 11/29/2024) for follow up.

## 2024-06-04 DIAGNOSIS — F41.1 GENERALIZED ANXIETY DISORDER: ICD-10-CM

## 2024-06-04 RX ORDER — ALPRAZOLAM 1 MG/1
TABLET ORAL
Qty: 60 TABLET | Refills: 2 | Status: SHIPPED | OUTPATIENT
Start: 2024-06-04

## 2024-06-06 ENCOUNTER — TELEPHONE (OUTPATIENT)
Dept: FAMILY MEDICINE | Facility: CLINIC | Age: 44
End: 2024-06-06
Payer: MEDICAID

## 2024-06-06 DIAGNOSIS — F33.9 RECURRENT MAJOR DEPRESSIVE DISORDER, REMISSION STATUS UNSPECIFIED: Primary | ICD-10-CM

## 2024-06-06 RX ORDER — ARIPIPRAZOLE 2 MG/1
2 TABLET ORAL DAILY
Qty: 30 TABLET | Refills: 11 | Status: SHIPPED | OUTPATIENT
Start: 2024-06-06 | End: 2025-06-06

## 2024-06-06 NOTE — ASSESSMENT & PLAN NOTE
Patient called still with a lot of depression symptoms since her mother  a month ago.  Crying every day, does not want to get out of the house.  Denies any suicidal ideations.  Add Abilify, patient will follow-up as needed.

## 2024-06-06 NOTE — TELEPHONE ENCOUNTER
1. Recurrent major depressive disorder, remission status unspecified  Overview:  Effexor - did not work, made her feel worse  Zoloft > 10 years, DC on 10/15/2021  10/15/2021 - DC Zoloft 200 mg daily, start Viibryd 20 mg daily   2022 - Viibryd 40 mg daily   2024 - and Abilify 2 mg daily    Assessment & Plan:  Patient called still with a lot of depression symptoms since her mother  a month ago.  Crying every day, does not want to get out of the house.  Denies any suicidal ideations.  Add Abilify, patient will follow-up as needed.    Orders:  -     ARIPiprazole (ABILIFY) 2 MG Tab; Take 1 tablet (2 mg total) by mouth once daily.  Dispense: 30 tablet; Refill: 11

## 2024-06-24 ENCOUNTER — TELEPHONE (OUTPATIENT)
Dept: FAMILY MEDICINE | Facility: CLINIC | Age: 44
End: 2024-06-24
Payer: MEDICAID

## 2024-06-24 DIAGNOSIS — F33.9 RECURRENT MAJOR DEPRESSIVE DISORDER, REMISSION STATUS UNSPECIFIED: Primary | ICD-10-CM

## 2024-06-24 RX ORDER — ARIPIPRAZOLE 5 MG/1
5 TABLET ORAL DAILY
Qty: 30 TABLET | Refills: 11 | Status: SHIPPED | OUTPATIENT
Start: 2024-06-24 | End: 2025-06-24

## 2024-06-24 NOTE — TELEPHONE ENCOUNTER
Patient called, initially doing better with her depression since starting the Abilify.  Feels like she is right back to where she started, it has been a couple of weeks since starting the Abilify 2 mg.  Increase Abilify to 5 mg daily, will call for follow-up.

## 2024-06-27 ENCOUNTER — OFFICE VISIT (OUTPATIENT)
Dept: FAMILY MEDICINE | Facility: CLINIC | Age: 44
End: 2024-06-27
Payer: MEDICAID

## 2024-06-27 VITALS
HEIGHT: 59 IN | WEIGHT: 245 LBS | SYSTOLIC BLOOD PRESSURE: 130 MMHG | TEMPERATURE: 98 F | BODY MASS INDEX: 49.39 KG/M2 | RESPIRATION RATE: 16 BRPM | HEART RATE: 80 BPM | DIASTOLIC BLOOD PRESSURE: 82 MMHG | OXYGEN SATURATION: 98 %

## 2024-06-27 DIAGNOSIS — J32.9 SINUSITIS, UNSPECIFIED CHRONICITY, UNSPECIFIED LOCATION: Primary | ICD-10-CM

## 2024-06-27 PROCEDURE — 3079F DIAST BP 80-89 MM HG: CPT | Mod: CPTII,,, | Performed by: NURSE PRACTITIONER

## 2024-06-27 PROCEDURE — 3008F BODY MASS INDEX DOCD: CPT | Mod: CPTII,,, | Performed by: NURSE PRACTITIONER

## 2024-06-27 PROCEDURE — 3044F HG A1C LEVEL LT 7.0%: CPT | Mod: CPTII,,, | Performed by: NURSE PRACTITIONER

## 2024-06-27 PROCEDURE — 4010F ACE/ARB THERAPY RXD/TAKEN: CPT | Mod: CPTII,,, | Performed by: NURSE PRACTITIONER

## 2024-06-27 PROCEDURE — 3075F SYST BP GE 130 - 139MM HG: CPT | Mod: CPTII,,, | Performed by: NURSE PRACTITIONER

## 2024-06-27 PROCEDURE — 1159F MED LIST DOCD IN RCRD: CPT | Mod: CPTII,,, | Performed by: NURSE PRACTITIONER

## 2024-06-27 PROCEDURE — 99213 OFFICE O/P EST LOW 20 MIN: CPT | Mod: ,,, | Performed by: NURSE PRACTITIONER

## 2024-06-27 PROCEDURE — 1160F RVW MEDS BY RX/DR IN RCRD: CPT | Mod: CPTII,,, | Performed by: NURSE PRACTITIONER

## 2024-06-27 RX ORDER — CEFDINIR 300 MG/1
300 CAPSULE ORAL 2 TIMES DAILY
Qty: 20 CAPSULE | Refills: 0 | Status: SHIPPED | OUTPATIENT
Start: 2024-06-27 | End: 2024-07-07

## 2024-06-27 RX ORDER — DEXAMETHASONE SODIUM PHOSPHATE 100 MG/10ML
10 INJECTION INTRAMUSCULAR; INTRAVENOUS
Status: COMPLETED | OUTPATIENT
Start: 2024-06-27 | End: 2024-06-27

## 2024-06-27 RX ADMIN — DEXAMETHASONE SODIUM PHOSPHATE 10 MG: 100 INJECTION INTRAMUSCULAR; INTRAVENOUS at 11:06

## 2024-06-27 NOTE — PROGRESS NOTES
Subjective:       Patient ID: Malia Schroeder is a 44 y.o. female.    Chief Complaint: Sinus Problem (X4 days ), Cough (X4 days ), and Nasal Congestion (X4 days )      HPI   This is a 44-year-old white female who presents to clinic today with complaint of sinus congestion, scratchy throat, facial pain that started on Saturday.  Denies any fever.  Review of Systems  Comprehensive review of systems negative except as stated in HPI    The patient's Health Maintenance was reviewed and the following appears to be due:   There are no preventive care reminders to display for this patient.    Past Medical History:  Past Medical History:   Diagnosis Date    Allergies     Anxiety disorder, unspecified     Chronic sinusitis, unspecified     Depression     GERD (gastroesophageal reflux disease)     Hyperlipidemia     Hypertension     Hypokalemia     Iron deficiency anemia, unspecified     Mild intermittent asthma, uncomplicated     Mild intermittent asthma, uncomplicated     Obstructive sleep apnea     Peripheral edema      Past Surgical History:   Procedure Laterality Date    CHOLECYSTECTOMY      HYSTERECTOMY      PLANTAR FASCIA RELEASE      SINUS SURGERY      TARSAL TUNNEL RELEASE      TONSILLECTOMY       Review of patient's allergies indicates:   Allergen Reactions    Hydrocodone-acetaminophen Hives    Promethazine Anxiety     Current Outpatient Medications on File Prior to Visit   Medication Sig Dispense Refill    albuterol-ipratropium (DUO-NEB) 2.5 mg-0.5 mg/3 mL nebulizer solution Inhale 3 mLs into the lungs.      ALPRAZolam (XANAX) 1 MG tablet TAKE 1 TABLET BY MOUTH TWICE A DAY FOR ANXIETY 60 tablet 2    ARIPiprazole (ABILIFY) 5 MG Tab Take 1 tablet (5 mg total) by mouth once daily. 30 tablet 11    benazepriL (LOTENSIN) 10 MG tablet TAKE 1 TABLET BY MOUTH TWICE A DAY FOR BLOOD PRESSURE 60 tablet 11    busPIRone (BUSPAR) 30 MG Tab Take 1 tablet (30 mg total) by mouth 3 (three) times daily. 90 tablet 11    meloxicam  (MOBIC) 15 MG tablet Take 1 tablet (15 mg total) by mouth once daily. 30 tablet 11    montelukast (SINGULAIR) 10 mg tablet Take 1 tablet (10 mg total) by mouth every evening. 30 tablet 11    mupirocin (BACTROBAN) 2 % ointment Apply topically 2 (two) times daily. 22 g 11    NIFEdipine (PROCARDIA-XL) 60 MG (OSM) 24 hr tablet TAKE 1 TABLET (60 MG TOTAL) BY MOUTH ONCE DAILY FOR BLOOD PRESSURE 30 tablet 11    ondansetron (ZOFRAN-ODT) 4 MG TbDL Take 1 tablet (4 mg total) by mouth every 6 (six) hours as needed (nausea). 30 tablet 3    pantoprazole (PROTONIX) 40 MG tablet TAKE 1 TABLET BY MOUTH TWICE DAILY FOR ACID REFLUX. 60 tablet 5    potassium chloride (MICRO-K) 10 MEQ CpSR Take 2 capsules (20 mEq total) by mouth once daily. 60 capsule 11    pravastatin (PRAVACHOL) 40 MG tablet Take 1 tablet (40 mg total) by mouth once daily. 30 tablet 11    spironolactone (ALDACTONE) 50 MG tablet TAKE 1 TABLET BY MOUTH ONCE DAILY. 30 tablet 11    tiZANidine (ZANAFLEX) 4 MG tablet Take 1 tablet (4 mg total) by mouth every 6 (six) hours as needed (muscle spasm). 30 tablet 3    vilazodone (VIIBRYD) 40 mg Tab tablet TAKE 1 TABLET (40 MG TOTAL) BY MOUTH ONCE DAILY FOR DEPRESSION/MOOD 30 tablet 11    zolpidem (AMBIEN) 5 MG Tab Take 1 tablet (5 mg total) by mouth nightly as needed (insomnia). 30 tablet 2     No current facility-administered medications on file prior to visit.     Social History     Socioeconomic History    Marital status: Single   Tobacco Use    Smoking status: Some Days     Current packs/day: 0.00     Types: Cigarettes    Smokeless tobacco: Never   Substance and Sexual Activity    Alcohol use: Not Currently     Comment: socially    Drug use: Never    Sexual activity: Not Currently     Social Determinants of Health     Financial Resource Strain: Low Risk  (11/27/2023)    Overall Financial Resource Strain (CARDIA)     Difficulty of Paying Living Expenses: Not hard at all   Food Insecurity: No Food Insecurity (11/27/2023)     "Hunger Vital Sign     Worried About Running Out of Food in the Last Year: Never true     Ran Out of Food in the Last Year: Never true   Transportation Needs: No Transportation Needs (11/27/2023)    PRAPARE - Transportation     Lack of Transportation (Medical): No     Lack of Transportation (Non-Medical): No   Physical Activity: Inactive (11/27/2023)    Exercise Vital Sign     Days of Exercise per Week: 0 days     Minutes of Exercise per Session: 0 min   Stress: Stress Concern Present (11/27/2023)    [employer] - Occupational Stress Questionnaire     Feeling of Stress : To some extent   Housing Stability: Low Risk  (11/27/2023)    Housing Stability Vital Sign     Unable to Pay for Housing in the Last Year: No     Number of Places Lived in the Last Year: 1     Unstable Housing in the Last Year: No     Family History   Problem Relation Name Age of Onset    Hypertension Mother Keyla Schroeder     Dementia Mother Keyla Schroeder     Arthritis Mother Keyla Schroeder     Cancer Father Sami Schroeder         Non hodgekins lymphoma       Objective:       /82 (BP Location: Left arm)   Pulse 80   Temp 98.1 °F (36.7 °C) (Oral)   Resp 16   Ht 4' 11" (1.499 m)   Wt 111.1 kg (245 lb)   SpO2 98%   BMI 49.48 kg/m²      Physical Exam  Vitals and nursing note reviewed.   Constitutional:       Appearance: Normal appearance. She is obese.   HENT:      Head: Normocephalic and atraumatic.      Right Ear: Tympanic membrane, ear canal and external ear normal.      Left Ear: Tympanic membrane, ear canal and external ear normal.      Nose: Congestion present.      Right Sinus: Maxillary sinus tenderness present.      Left Sinus: Maxillary sinus tenderness present.      Mouth/Throat:      Mouth: Mucous membranes are moist.      Pharynx: Oropharynx is clear.   Eyes:      Extraocular Movements: Extraocular movements intact.      Conjunctiva/sclera: Conjunctivae normal.      Pupils: Pupils are equal, round, and " reactive to light.   Cardiovascular:      Rate and Rhythm: Normal rate and regular rhythm.      Heart sounds: Normal heart sounds.   Pulmonary:      Effort: Pulmonary effort is normal.      Breath sounds: Normal breath sounds.   Musculoskeletal:         General: Normal range of motion.      Cervical back: Normal range of motion and neck supple.   Skin:     General: Skin is warm and dry.   Neurological:      General: No focal deficit present.      Mental Status: She is alert and oriented to person, place, and time.   Psychiatric:         Mood and Affect: Mood normal.         Behavior: Behavior normal.         Thought Content: Thought content normal.         Judgment: Judgment normal.             Assessment and Plan       ICD-10-CM ICD-9-CM   1. Sinusitis, unspecified chronicity, unspecified location  J32.9 473.9        1. Sinusitis, unspecified chronicity, unspecified location  Comments:  Decadron 10 mg IM in clinic today.  Cefdinir twice daily for 10 days.  Orders:  -     dexAMETHasone injection 10 mg  -     cefdinir (OMNICEF) 300 MG capsule; Take 1 capsule (300 mg total) by mouth 2 (two) times daily. for 10 days  Dispense: 20 capsule; Refill: 0           Follow up if symptoms worsen or fail to improve.

## 2024-08-09 ENCOUNTER — TELEPHONE (OUTPATIENT)
Dept: FAMILY MEDICINE | Facility: CLINIC | Age: 44
End: 2024-08-09
Payer: MEDICAID

## 2024-08-09 DIAGNOSIS — S86.912A STRAIN OF LEFT KNEE, INITIAL ENCOUNTER: Primary | ICD-10-CM

## 2024-08-09 RX ORDER — KETOROLAC TROMETHAMINE 10 MG/1
10 TABLET, FILM COATED ORAL 3 TIMES DAILY PRN
Qty: 15 TABLET | Refills: 0 | Status: SHIPPED | OUTPATIENT
Start: 2024-08-09 | End: 2024-08-14

## 2024-09-02 PROBLEM — Z00.00 ANNUAL PHYSICAL EXAM: Status: RESOLVED | Noted: 2023-05-24 | Resolved: 2024-09-02

## 2024-09-03 DIAGNOSIS — F41.1 GENERALIZED ANXIETY DISORDER: ICD-10-CM

## 2024-09-03 RX ORDER — ALPRAZOLAM 1 MG/1
TABLET ORAL
Qty: 60 TABLET | Refills: 2 | Status: SHIPPED | OUTPATIENT
Start: 2024-09-03

## 2024-09-22 ENCOUNTER — HOSPITAL ENCOUNTER (EMERGENCY)
Facility: HOSPITAL | Age: 44
Discharge: HOME | End: 2024-09-22
Attending: FAMILY MEDICINE
Payer: COMMERCIAL

## 2024-09-22 VITALS
DIASTOLIC BLOOD PRESSURE: 74 MMHG | WEIGHT: 250 LBS | RESPIRATION RATE: 24 BRPM | HEART RATE: 120 BPM | OXYGEN SATURATION: 94 % | HEIGHT: 59 IN | BODY MASS INDEX: 50.4 KG/M2 | SYSTOLIC BLOOD PRESSURE: 137 MMHG | TEMPERATURE: 98 F

## 2024-09-22 DIAGNOSIS — R07.9 CHEST PAIN: ICD-10-CM

## 2024-09-22 DIAGNOSIS — R06.02 SHORTNESS OF BREATH: ICD-10-CM

## 2024-09-22 DIAGNOSIS — R00.2 PALPITATIONS: ICD-10-CM

## 2024-09-22 LAB
ALBUMIN SERPL-MCNC: 4.2 G/DL (ref 3.5–5)
ALBUMIN/GLOB SERPL: 1 RATIO (ref 1.1–2)
ALP SERPL-CCNC: 155 UNIT/L (ref 40–150)
ALT SERPL-CCNC: 24 UNIT/L (ref 0–55)
ANION GAP SERPL CALC-SCNC: 13 MEQ/L
AST SERPL-CCNC: 18 UNIT/L (ref 5–34)
BASOPHILS # BLD AUTO: 0.04 X10(3)/MCL
BASOPHILS NFR BLD AUTO: 0.4 %
BILIRUB SERPL-MCNC: 0.4 MG/DL
BUN SERPL-MCNC: 11.7 MG/DL (ref 7–18.7)
CALCIUM SERPL-MCNC: 10.1 MG/DL (ref 8.4–10.2)
CHLORIDE SERPL-SCNC: 106 MMOL/L (ref 98–107)
CO2 SERPL-SCNC: 21 MMOL/L (ref 22–29)
CREAT SERPL-MCNC: 0.97 MG/DL (ref 0.55–1.02)
CREAT/UREA NIT SERPL: 12
EOSINOPHIL # BLD AUTO: 0.49 X10(3)/MCL (ref 0–0.9)
EOSINOPHIL NFR BLD AUTO: 4.9 %
ERYTHROCYTE [DISTWIDTH] IN BLOOD BY AUTOMATED COUNT: 13 % (ref 11.5–17)
GFR SERPLBLD CREATININE-BSD FMLA CKD-EPI: >60 ML/MIN/1.73/M2
GLOBULIN SER-MCNC: 4.3 GM/DL (ref 2.4–3.5)
GLUCOSE SERPL-MCNC: 137 MG/DL (ref 74–100)
HCT VFR BLD AUTO: 41.3 % (ref 37–47)
HGB BLD-MCNC: 13.8 G/DL (ref 12–16)
IMM GRANULOCYTES # BLD AUTO: 0.06 X10(3)/MCL (ref 0–0.04)
IMM GRANULOCYTES NFR BLD AUTO: 0.6 %
LYMPHOCYTES # BLD AUTO: 3.16 X10(3)/MCL (ref 0.6–4.6)
LYMPHOCYTES NFR BLD AUTO: 31.3 %
MCH RBC QN AUTO: 29.9 PG (ref 27–31)
MCHC RBC AUTO-ENTMCNC: 33.4 G/DL (ref 33–36)
MCV RBC AUTO: 89.6 FL (ref 80–94)
MONOCYTES # BLD AUTO: 0.6 X10(3)/MCL (ref 0.1–1.3)
MONOCYTES NFR BLD AUTO: 6 %
NEUTROPHILS # BLD AUTO: 5.73 X10(3)/MCL (ref 2.1–9.2)
NEUTROPHILS NFR BLD AUTO: 56.8 %
PLATELET # BLD AUTO: 295 X10(3)/MCL (ref 130–400)
PMV BLD AUTO: 9.3 FL (ref 7.4–10.4)
POTASSIUM SERPL-SCNC: 3 MMOL/L (ref 3.5–5.1)
PROT SERPL-MCNC: 8.5 GM/DL (ref 6.4–8.3)
RBC # BLD AUTO: 4.61 X10(6)/MCL (ref 4.2–5.4)
SODIUM SERPL-SCNC: 140 MMOL/L (ref 136–145)
TROPONIN I SERPL-MCNC: <0.01 NG/ML (ref 0–0.04)
WBC # BLD AUTO: 10.08 X10(3)/MCL (ref 4.5–11.5)

## 2024-09-22 PROCEDURE — 84484 ASSAY OF TROPONIN QUANT: CPT | Performed by: FAMILY MEDICINE

## 2024-09-22 PROCEDURE — 25000003 PHARM REV CODE 250: Performed by: FAMILY MEDICINE

## 2024-09-22 PROCEDURE — 93005 ELECTROCARDIOGRAM TRACING: CPT

## 2024-09-22 PROCEDURE — 85025 COMPLETE CBC W/AUTO DIFF WBC: CPT | Performed by: FAMILY MEDICINE

## 2024-09-22 PROCEDURE — 96361 HYDRATE IV INFUSION ADD-ON: CPT

## 2024-09-22 PROCEDURE — 96374 THER/PROPH/DIAG INJ IV PUSH: CPT

## 2024-09-22 PROCEDURE — 80053 COMPREHEN METABOLIC PANEL: CPT | Performed by: FAMILY MEDICINE

## 2024-09-22 PROCEDURE — 93010 ELECTROCARDIOGRAM REPORT: CPT | Mod: ,,, | Performed by: INTERNAL MEDICINE

## 2024-09-22 PROCEDURE — 99285 EMERGENCY DEPT VISIT HI MDM: CPT | Mod: 25

## 2024-09-22 PROCEDURE — 63600175 PHARM REV CODE 636 W HCPCS: Performed by: FAMILY MEDICINE

## 2024-09-22 RX ORDER — LORAZEPAM 2 MG/ML
1 INJECTION INTRAMUSCULAR
Status: COMPLETED | OUTPATIENT
Start: 2024-09-22 | End: 2024-09-22

## 2024-09-22 RX ORDER — POTASSIUM CHLORIDE 20 MEQ/1
40 TABLET, EXTENDED RELEASE ORAL
Status: COMPLETED | OUTPATIENT
Start: 2024-09-22 | End: 2024-09-22

## 2024-09-22 RX ADMIN — POTASSIUM CHLORIDE 40 MEQ: 1500 TABLET, EXTENDED RELEASE ORAL at 08:09

## 2024-09-22 RX ADMIN — LORAZEPAM 1 MG: 2 INJECTION INTRAMUSCULAR; INTRAVENOUS at 07:09

## 2024-09-22 RX ADMIN — SODIUM CHLORIDE 500 ML: 9 INJECTION, SOLUTION INTRAVENOUS at 07:09

## 2024-09-23 ENCOUNTER — TELEPHONE (OUTPATIENT)
Dept: FAMILY MEDICINE | Facility: CLINIC | Age: 44
End: 2024-09-23
Payer: COMMERCIAL

## 2024-09-23 DIAGNOSIS — R06.09 DYSPNEA ON EXERTION: Primary | ICD-10-CM

## 2024-09-23 DIAGNOSIS — R07.9 CHEST PAIN, UNSPECIFIED TYPE: ICD-10-CM

## 2024-09-23 LAB
OHS QRS DURATION: 86 MS
OHS QTC CALCULATION: 426 MS

## 2024-09-23 NOTE — TELEPHONE ENCOUNTER
Spoke to Aurea Moreno @ [facility], she can pull all of this information from [facility], she will call patient tomorrow morning to get her scheduled ASAP.

## 2024-09-23 NOTE — ED PROVIDER NOTES
Encounter Date: 9/22/2024       History     Chief Complaint   Patient presents with    Palpitations     Pt with complaints of palpitations that started about 30 minutes ago. Denies cp. Had some sob from walking to er entrance.     Shortness of Breath     Palpitations  44-year-old with palpitations patient has history of panic attacks otherwise no history arrhythmias including SVT patient is the history source chronic conditions as above        Review of patient's allergies indicates:   Allergen Reactions    Hydrocodone-acetaminophen Hives    Promethazine Anxiety     Past Medical History:   Diagnosis Date    Allergies     Anxiety disorder, unspecified     Chronic sinusitis, unspecified     Depression     GERD (gastroesophageal reflux disease)     Hyperlipidemia     Hypertension     Hypokalemia     Iron deficiency anemia, unspecified     Mild intermittent asthma, uncomplicated     Mild intermittent asthma, uncomplicated     Obstructive sleep apnea     Peripheral edema      Past Surgical History:   Procedure Laterality Date    CHOLECYSTECTOMY      HYSTERECTOMY      PLANTAR FASCIA RELEASE      SINUS SURGERY      TARSAL TUNNEL RELEASE      TONSILLECTOMY       Family History   Problem Relation Name Age of Onset    Hypertension Mother Keyla Schroeder     Dementia Mother Keyla Schroeder     Arthritis Mother Keyla Schroeder     Cancer Father Sami Schroeder         Non hodgekins lymphoma     Social History     Tobacco Use    Smoking status: Some Days     Current packs/day: 0.00     Types: Cigarettes    Smokeless tobacco: Never   Substance Use Topics    Alcohol use: Not Currently     Comment: socially    Drug use: Never     Review of Systems   Constitutional:  Negative for fever.   HENT:  Negative for sore throat.    Respiratory:  Negative for shortness of breath.    Cardiovascular:  Positive for palpitations. Negative for chest pain.   Gastrointestinal:  Negative for nausea.   Genitourinary:  Negative for dysuria.   Musculoskeletal:   Negative for back pain.   Skin:  Negative for rash.   Neurological:  Negative for weakness.   Hematological:  Does not bruise/bleed easily.       Physical Exam     Initial Vitals [09/22/24 1908]   BP Pulse Resp Temp SpO2   138/82 (!) 125 18 97.9 °F (36.6 °C) 96 %      MAP       --         Physical Exam    Nursing note and vitals reviewed.  Constitutional: She appears well-developed.   HENT:   Head: Normocephalic and atraumatic.   Right Ear: External ear normal.   Left Ear: External ear normal.   Nose: Nose normal.   Mouth/Throat: Oropharynx is clear and moist. No oropharyngeal exudate.   Eyes: Conjunctivae and EOM are normal. Pupils are equal, round, and reactive to light. Right eye exhibits no discharge. Left eye exhibits no discharge.   Neck: Neck supple. No tracheal deviation present. No JVD present.   Normal range of motion.  Cardiovascular:  Normal heart sounds and intact distal pulses.     Exam reveals no gallop and no friction rub.       No murmur heard.  Pulmonary/Chest: Breath sounds normal. No stridor. No respiratory distress. She has no wheezes. She has no rhonchi. She has no rales.   Abdominal: Abdomen is soft. Bowel sounds are normal. She exhibits no distension and no mass. There is no abdominal tenderness. There is no rebound and no guarding.   Musculoskeletal:         General: Normal range of motion.      Cervical back: Normal range of motion and neck supple.     Neurological: She is alert and oriented to person, place, and time. She has normal strength. No cranial nerve deficit.   Skin: Skin is warm and dry. No rash and no abscess noted. No erythema.   Psychiatric: She has a normal mood and affect. Her behavior is normal. Judgment and thought content normal.         ED Course   Procedures  Labs Reviewed   COMPREHENSIVE METABOLIC PANEL - Abnormal       Result Value    Sodium 140      Potassium 3.0 (*)     Chloride 106      CO2 21 (*)     Glucose 137 (*)     Blood Urea Nitrogen 11.7      Creatinine  0.97      Calcium 10.1      Protein Total 8.5 (*)     Albumin 4.2      Globulin 4.3 (*)     Albumin/Globulin Ratio 1.0 (*)     Bilirubin Total 0.4       (*)     ALT 24      AST 18      eGFR >60      Anion Gap 13.0      BUN/Creatinine Ratio 12     CBC WITH DIFFERENTIAL - Abnormal    WBC 10.08      RBC 4.61      Hgb 13.8      Hct 41.3      MCV 89.6      MCH 29.9      MCHC 33.4      RDW 13.0      Platelet 295      MPV 9.3      Neut % 56.8      Lymph % 31.3      Mono % 6.0      Eos % 4.9      Basophil % 0.4      Lymph # 3.16      Neut # 5.73      Mono # 0.60      Eos # 0.49      Baso # 0.04      IG# 0.06 (*)     IG% 0.6     TROPONIN I - Normal    Troponin-I <0.010     CBC W/ AUTO DIFFERENTIAL    Narrative:     The following orders were created for panel order CBC auto differential.  Procedure                               Abnormality         Status                     ---------                               -----------         ------                     CBC with Differential[[id number]]       Abnormal            Final result                 Please view results for these tests on the individual orders.          Imaging Results              X-Ray Chest AP Portable (Final result)  Result time 09/22/24 19:53:33      Final result by Tristin Sheridan MD (09/22/24 19:53:33)                   Impression:      No acute abnormality.      Electronically signed by: Tristin Sheridan MD  Date:    09/22/2024  Time:    19:53               Narrative:    EXAMINATION:  XR CHEST AP PORTABLE    CLINICAL HISTORY:  Chest Pain;    TECHNIQUE:  Single frontal view of the chest was performed.    COMPARISON:  09/20/2021    FINDINGS:  The lungs are clear, with normal appearance of pulmonary vasculature and no pleural effusion or pneumothorax.    The cardiac silhouette is normal in size. The hilar and mediastinal contours are unremarkable.    Bones are intact.                                       Medications   sodium chloride 0.9% bolus 500 mL  500 mL (500 mLs Intravenous New Bag 9/22/24 1930)   LORazepam injection 1 mg (1 mg Intravenous Given 9/22/24 1945)   potassium chloride SA CR tablet 40 mEq (40 mEq Oral Given 9/22/24 2006)     Medical Decision Making  Arrhythmia palpitations SVT    Amount and/or Complexity of Data Reviewed  Labs: ordered.  Radiology: ordered.    Risk  Prescription drug management.                                      Clinical Impression:  Final diagnoses:  [R00.2] Palpitations  [R07.9] Chest pain          ED Disposition Condition    Discharge Stable          ED Prescriptions    None       Follow-up Information       Follow up With Specialties Details Why Contact Info    Nighat Garcia, FNP Family Medicine   [address] [place] [id number]  [phone]               Obie Calzada MD  09/22/24 2009

## 2024-09-23 NOTE — TELEPHONE ENCOUNTER
Patient with ER visit yesterday 09/22/2024 for palpitation/tachycardia.  Discharged with diagnosis of hypokalemia.  She was given potassium replacement while in ED.  Was given Ativan as well.  Did not have any chest pain at the time, troponin was negative.  Cardiology over-read on EKG done in the ED reviewed, ST and T-wave abnormalities noted she has gotten some flattened T-waves but notably, she has some ST depression noted in V3.  When compared to her EKG from 2021, this has new.  Of note, I did have a conversation with patient on September 3, 2024 via telephone where she was complaining of right-sided chest pain that went through and through her chest.  Pain was intermittent throughout the day.  At that time, she declined to go to the ED. was strongly encouraged to go to the ED. she did not go to the ED, took a Toradol that evening and the pain resolved.  Pain has not returned.  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  She has been getting short of breath and having to stop before reaching the top of the stairs.  Spoke to patient today and discussed with her that I think she needs to follow back up with Dr. Saldivar.  She has not seen him in a couple of years.  Will send this note along with recent EKG over to Dr. Saldivar office and see when they can squeeze her in.  Patient okay with this plan.  She was instructed to go to ED for any return of chest pain.  She verbalized understanding.

## 2024-09-25 ENCOUNTER — LAB VISIT (OUTPATIENT)
Dept: LAB | Facility: HOSPITAL | Age: 44
End: 2024-09-25
Attending: INTERNAL MEDICINE
Payer: COMMERCIAL

## 2024-09-25 DIAGNOSIS — R00.2 PALPITATIONS: Primary | ICD-10-CM

## 2024-09-25 LAB — TSH SERPL-ACNC: 1.39 UIU/ML (ref 0.35–4.94)

## 2024-09-25 PROCEDURE — 84443 ASSAY THYROID STIM HORMONE: CPT

## 2024-09-25 PROCEDURE — 36415 COLL VENOUS BLD VENIPUNCTURE: CPT

## 2024-10-07 DIAGNOSIS — F32.9 MAJOR DEPRESSIVE DISORDER, REMISSION STATUS UNSPECIFIED, UNSPECIFIED WHETHER RECURRENT: ICD-10-CM

## 2024-10-07 RX ORDER — VILAZODONE HYDROCHLORIDE 40 MG/1
TABLET ORAL
Qty: 30 TABLET | Refills: 11 | Status: SHIPPED | OUTPATIENT
Start: 2024-10-07

## 2024-10-10 ENCOUNTER — CLINICAL SUPPORT (OUTPATIENT)
Dept: FAMILY MEDICINE | Facility: CLINIC | Age: 44
End: 2024-10-10
Payer: COMMERCIAL

## 2024-10-10 DIAGNOSIS — E87.6 HYPOKALEMIA: Primary | ICD-10-CM

## 2024-10-10 LAB
ANION GAP SERPL CALC-SCNC: 10 MEQ/L
BUN SERPL-MCNC: 16.2 MG/DL (ref 7–18.7)
CALCIUM SERPL-MCNC: 9.4 MG/DL (ref 8.4–10.2)
CHLORIDE SERPL-SCNC: 103 MMOL/L (ref 98–107)
CO2 SERPL-SCNC: 25 MMOL/L (ref 22–29)
CREAT SERPL-MCNC: 0.79 MG/DL (ref 0.55–1.02)
CREAT/UREA NIT SERPL: 21
GFR SERPLBLD CREATININE-BSD FMLA CKD-EPI: >60 ML/MIN/1.73/M2
GLUCOSE SERPL-MCNC: 94 MG/DL (ref 74–100)
POTASSIUM SERPL-SCNC: 4.1 MMOL/L (ref 3.5–5.1)
SODIUM SERPL-SCNC: 138 MMOL/L (ref 136–145)

## 2024-10-10 PROCEDURE — 80048 BASIC METABOLIC PNL TOTAL CA: CPT | Performed by: NURSE PRACTITIONER

## 2024-10-10 PROCEDURE — 36415 COLL VENOUS BLD VENIPUNCTURE: CPT

## 2024-10-10 NOTE — PROGRESS NOTES
Patient presents for lab draw. R AC, tolerated well. Labs sent to [facility]    1TT

## 2024-10-10 NOTE — PROGRESS NOTES
MEDICARE WELLNESS VISIT + NOTE    Patient cancelled this appointment. He was angry. And, stated that he was not here for a Medicare Wellness visit. He left out of the office w/o being seen.    CHIEF COMPLAINT:    Shai Sprague presents for his Subsequent Medicare Wellness Visit.   His additional complaints or concerns are addressed below.      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)   • GERD (gastroesophageal reflux disease)   • Lumbar radiculopathy   • Hyperlipidemia LDL goal <100   • Hypertrophy of prostate without urinary obstruction   • Fatty liver   • Medicare annual wellness visit, subsequent   • Advance directive discussed with patient   • At standard risk for fall   • Body mass index 33.0-33.9, adult   • Dietary counseling   • Type 2 diabetes mellitus with circulatory disorder, without long-term current use of insulin (CMD)   • Type 2 diabetes mellitus with hyperglycemia, without long-term current use of insulin (CMD)         Past Medical History:   Diagnosis Date   • Diabetes mellitus (CMD)    • Essential (primary) hypertension    • GERD (gastroesophageal reflux disease)          Past Surgical History:   Procedure Laterality Date   • Back surgery           Social History     Tobacco Use   • Smoking status: Never   • Smokeless tobacco: Never   Vaping Use   • Vaping Use: never used   Substance Use Topics   • Alcohol use: Yes     Alcohol/week: 12.0 - 24.0 standard drinks     Types: 12 - 24 Cans of beer per week   • Drug use: No           Current Outpatient Medications   Medication Sig Dispense Refill   • sildenafil (VIAGRA) 100 MG tablet Take 1 tablet by mouth as needed for erectile dysfunction 10 tablet 0   •  Potassium back to normal. metformin (GLUCOPHAGE) 1000 MG tablet Take 1 tablet by mouth in the morning and 1 tablet in the evening. Take with meals. 180 tablet 1   • losartan (COZAAR) 100 MG tablet Take 1 tablet by mouth daily. 90 tablet 3   • hydroCHLOROthiazide (HYDRODIURIL) 25 MG tablet Take 1 tablet by mouth daily. 90 tablet 3   • glipiZIDE (GLUCOTROL) 5 MG tablet Take 1 tablet by mouth daily. 90 tablet 3   • atorvastatin (LIPITOR) 20 MG tablet Take 1 tablet by mouth daily. 90 tablet 3   • omeprazole (PriLOSEC) 40 MG capsule TAKE ONE CAPSULE BY MOUTH ONCE DAILY 90 capsule 0   • DULoxetine (CYMBALTA) 60 MG capsule TK 1 C PO QD HS  4   • gabapentin (NEURONTIN) 600 MG tablet Take by mouth every 12 hours.       No current facility-administered medications for this visit.        The following items on the Medicare Health Risk Assessment were found to be positive  1.) Do you have an Advance directive, living will, or power of  for health care document that contains your wishes for end of life care?: No     2.) Would you like additional information on advance directives?: Yes     4.) During the past 4 weeks, what was the hardest physical activity you could do for at least 2 minutes?: Light     6 a.) How many servings of Fruits and Vegetables do you have each day ( 1 serving = 1 piece of fruit, 1/2 cup fruits or vegetables): 1 per day     6 b.) How many servings of High Fiber / Whole Grain Foods to you have each day ( 1 serving = 1 cup cold cereal, 1/2 cup cooked cereal, 1 slice bread): 1 per day     9.) Do you feel safe at home?: No         Vision and Hearing screens:     Advance care planning documents on file - no    Cognitive/Functional Status:     Opioid Review: Shai     Recent PHQ 2/9 Score:    PHQ 2:  Date Adult PHQ 2 Score Adult PHQ 2 Interpretation   3/30/2023 0 No further screening needed       PHQ 9:       DEPRESSION ASSESSMENT/PLAN:       Body mass index is 32.41 kg/m².    BMI ASSESSMENT/PLAN:            OUTPATIENT PROGRESS  NOTE    Subjective   Chief Complaint Medicare Wellness Visit    HPI     Medications  Medications were reviewed and updated today.    Histories  I have personally reviewed and updated the patient's past medical, past surgical, family and social histories during today's visit.    Review of Systems    Objective   Visit Vitals  /84 (BP Location: LUE - Left upper extremity, Patient Position: Sitting, Cuff Size: Regular)   Pulse 91   Temp 98 °F (36.7 °C) (Tympanic)   Resp 16   Ht 6' 2\" (1.88 m)   Wt 114.5 kg (252 lb 6.8 oz)   SpO2 99%   BMI 32.41 kg/m²     Physical Exam    Laboratory  I have reviewed the pertinent laboratory tests. These are the pertinent findings:  ·     Imaging  I have reviewed the pertinent imaging study reports. These are the pertinent findings:  ·

## 2024-10-21 ENCOUNTER — TELEPHONE (OUTPATIENT)
Dept: FAMILY MEDICINE | Facility: CLINIC | Age: 44
End: 2024-10-21
Payer: COMMERCIAL

## 2024-10-21 DIAGNOSIS — S83.91XA SPRAIN OF RIGHT KNEE, UNSPECIFIED LIGAMENT, INITIAL ENCOUNTER: Primary | ICD-10-CM

## 2024-10-21 RX ORDER — ACETAMINOPHEN AND CODEINE PHOSPHATE 300; 30 MG/1; MG/1
1 TABLET ORAL EVERY 6 HOURS PRN
Qty: 28 TABLET | Refills: 0 | Status: SHIPPED | OUTPATIENT
Start: 2024-10-21 | End: 2024-10-28

## 2024-10-21 NOTE — TELEPHONE ENCOUNTER
Patient called, stated last Friday felt pop an pain on the inside of her right knee.  Pain to the medial aspect of the knee.  Denies any injury but has been going up and down a lot of stairs, started a new job with the [employer].  Previously more sedentary.  She was instructed on Friday to wear a knee brace, ice, elevate, use crutches.  Instructed to call Dr. Bauman for follow-up.  Today patient reports still having a lot of pain, prescription Tylenol No. 3 sent to the pharmacy.  Again encouraged to call Dr. Bauman's office for follow-up.

## 2024-10-24 ENCOUNTER — TELEPHONE (OUTPATIENT)
Dept: ORTHOPEDICS | Facility: CLINIC | Age: 44
End: 2024-10-24

## 2024-10-24 ENCOUNTER — HOSPITAL ENCOUNTER (OUTPATIENT)
Dept: RADIOLOGY | Facility: CLINIC | Age: 44
Discharge: HOME | End: 2024-10-24
Attending: PHYSICIAN ASSISTANT
Payer: COMMERCIAL

## 2024-10-24 ENCOUNTER — OFFICE VISIT (OUTPATIENT)
Dept: ORTHOPEDICS | Facility: CLINIC | Age: 44
End: 2024-10-24
Payer: COMMERCIAL

## 2024-10-24 VITALS
HEIGHT: 59 IN | SYSTOLIC BLOOD PRESSURE: 99 MMHG | WEIGHT: 250 LBS | HEART RATE: 75 BPM | DIASTOLIC BLOOD PRESSURE: 67 MMHG | BODY MASS INDEX: 50.4 KG/M2

## 2024-10-24 DIAGNOSIS — M17.11 PRIMARY OSTEOARTHRITIS OF RIGHT KNEE: ICD-10-CM

## 2024-10-24 DIAGNOSIS — M25.561 RIGHT KNEE PAIN, UNSPECIFIED CHRONICITY: Primary | ICD-10-CM

## 2024-10-24 DIAGNOSIS — M25.561 RIGHT KNEE PAIN, UNSPECIFIED CHRONICITY: ICD-10-CM

## 2024-10-24 PROCEDURE — 73564 X-RAY EXAM KNEE 4 OR MORE: CPT | Mod: RT,,, | Performed by: PHYSICIAN ASSISTANT

## 2024-10-24 RX ADMIN — BETAMETHASONE SODIUM PHOSPHATE AND BETAMETHASONE ACETATE 12 MG: 3; 3 INJECTION, SUSPENSION INTRA-ARTICULAR; INTRALESIONAL; INTRAMUSCULAR; SOFT TISSUE at 09:10

## 2024-10-24 NOTE — TELEPHONE ENCOUNTER
Patient is requesting a status on her rx.     I called and apologized to patient that Omar is out for the afternoon, but when he stops by tomorrow morning I will remind him of the rx. Patient voiced understanding.

## 2024-10-25 RX ORDER — BETAMETHASONE SODIUM PHOSPHATE AND BETAMETHASONE ACETATE 3; 3 MG/ML; MG/ML
12 INJECTION, SUSPENSION INTRA-ARTICULAR; INTRALESIONAL; INTRAMUSCULAR; SOFT TISSUE
Status: DISCONTINUED | OUTPATIENT
Start: 2024-10-24 | End: 2024-10-25 | Stop reason: HOSPADM

## 2024-10-25 RX ORDER — DICLOFENAC SODIUM 75 MG/1
75 TABLET, DELAYED RELEASE ORAL 2 TIMES DAILY
Qty: 60 TABLET | Refills: 1 | Status: SHIPPED | OUTPATIENT
Start: 2024-10-25

## 2024-10-25 NOTE — PROCEDURES
Large Joint Aspiration/Injection: R knee    Date/Time: 10/24/2024 9:15 AM    Performed by: Omar Headley PA-C  Authorized by: Omar Headley PA-C    Consent Done?:  Yes (Verbal)  Indications:  Arthritis  Timeout: prior to procedure the correct patient, procedure, and site was verified    Prep: patient was prepped and draped in usual sterile fashion      Local anesthesia used?: Yes    Local anesthetic:  Lidocaine 2% without epinephrine    Details:  Needle Size:  25 G  Ultrasonic Guidance for needle placement?: No    Location:  Knee  Site:  R knee  Medications:  12 mg betamethasone acetate-betamethasone sodium phosphate 6 mg/mL  Patient tolerance:  Patient tolerated the procedure well with no immediate complications

## 2024-10-25 NOTE — PROGRESS NOTES
Chief Complaint:   Chief Complaint   Patient presents with    Knee Pain     R knee pain - ongoing since Friday 10/18/24. While walking she felt an increase pain. States it has gradually been getting worse. Reports stabbing pain on the medial/lateral aspect of the knee. Tylenol/knee sleeve to help ease her pain        History of present illness:    This is a 44 y.o. year old female who complains of right knee pain.    Patient states her knee pain started last week when she was walking in her kitchen and she felt a sudden sharp pain in her right knee in the medial aspect of the knee.    She reports stabbing pain with no specific injury or trauma.    She has been using a Velcro knee sleeve which does give her some relief she states.          Current Outpatient Medications   Medication Sig    acetaminophen-codeine 300-30mg (TYLENOL #3) 300-30 mg Tab Take 1 tablet by mouth every 6 (six) hours as needed (pain).    ALPRAZolam (XANAX) 1 MG tablet TAKE ONE (1) TABLET BY MOUTH TWICE A DAY FOR ANXIETY    ARIPiprazole (ABILIFY) 5 MG Tab Take 1 tablet (5 mg total) by mouth once daily.    benazepriL (LOTENSIN) 10 MG tablet TAKE 1 TABLET BY MOUTH TWICE A DAY FOR BLOOD PRESSURE    busPIRone (BUSPAR) 30 MG Tab Take 1 tablet (30 mg total) by mouth 3 (three) times daily.    montelukast (SINGULAIR) 10 mg tablet Take 1 tablet (10 mg total) by mouth every evening.    NIFEdipine (PROCARDIA-XL) 60 MG (OSM) 24 hr tablet TAKE 1 TABLET (60 MG TOTAL) BY MOUTH ONCE DAILY FOR BLOOD PRESSURE    pantoprazole (PROTONIX) 40 MG tablet TAKE 1 TABLET BY MOUTH TWICE DAILY FOR ACID REFLUX.    potassium chloride (MICRO-K) 10 MEQ CpSR Take 2 capsules (20 mEq total) by mouth once daily.    pravastatin (PRAVACHOL) 40 MG tablet Take 1 tablet (40 mg total) by mouth once daily.    spironolactone (ALDACTONE) 50 MG tablet TAKE 1 TABLET BY MOUTH ONCE DAILY.    vilazodone (VIIBRYD) 40 mg Tab tablet TAKE ONE (1) TABLET (40 MG TOTAL) BY MOUTH ONCE DAILY FOR  "DEPRESSION/MOOD     No current facility-administered medications for this visit.       Review of Systems:    Constitution:   Denies chills, fever, and sweats.  HENT:   Denies headaches or blurry vision.  Cardiovascular:  Denies chest pain or irregular heart beat.  Respiratory:   Denies cough or shortness of breath.  Gastrointestinal:  Denies abdominal pain, nausea, or vomiting.  Musculoskeletal:   Denies muscle cramps.  Neurological:   Denies dizziness or focal weakness.  Psychiatric/Behavior: Normal mental status.  Hematology/Lymph:  Denies bleeding problem or easy bruising/bleeding.  Skin:    Denies rash or suspicious lesions.    Examination:    Vital Signs:    Vitals:    10/24/24 0920   BP: 99/67   Pulse: 75   Weight: 113.4 kg (250 lb)   Height: 4' 11" (1.499 m)       Body mass index is 50.49 kg/m².    Constitution:   Well-developed, well nourished patient in no acute distress.  Neurological:   Alert and oriented x 3 and cooperative to examination.     Psychiatric/Behavior: Normal mental status.  Respiratory:   No shortness of breath.  Eyes:    Extraoccular muscles intact  Skin:    No scars, rash or suspicious lesions.    Physical Exam:       General Musculoskeletal Exam   Gait: antalgic       Right Knee Exam     Inspection   Erythema: absent  Effusion: present  Deformity: present  Bruising: absent    Tenderness   The patient is tender to palpation of the medial and patellofemoral joint line    Crepitus   The patient has crepitus with ROM    Range of Motion   Extension: abnormal   Flexion: abnormal   5-125    Tests   Meniscus   Leda:  Negative  Ligament Examination   MCL - Valgus: normal (0 to 2mm)  LCL - Varus: normal  Patella   Passive Patellar Tilt: neutral    Other   Sensation: normal    Comments:  Varus deformity    Muscle Strength   Right Lower Extremity   Quadriceps:  5/5   Hamstrin/5     Vascular Exam     Right Pulses  Dorsalis Pedis:      2+  Posterior Tibial:      2+      Imaging: X-rays " ordered and images interpreted today personally by me of right knee four views   Patient does have significant degenerative changes in both her knees.    The right knee is bone-on-bone medial compartment with a varus aligned leg.    She was no acute osseous abnormalities noted.            Assessment: Right knee pain, unspecified chronicity  -     X-Ray Knee Complete 4 Or More Views Right; Future; Expected date: 10/24/2024    Primary osteoarthritis of right knee         Plan:  At this point we discussed with the conservative treatment options for a knee such as hers which he was bone-on-bone .    He was measures including anti-inflammatories by mouth.  Weight reduction.  Intra-articular injections.    Patient was likes to go with a cortisone injection today we will see her back in a month to see how her pain is doing at that time          DISCLAIMER: This note may have been dictated using voice recognition software and may contain grammatical errors.     NOTE: Consult report sent to referring provider via [facility] EMR.

## 2024-11-04 ENCOUNTER — PATIENT MESSAGE (OUTPATIENT)
Dept: FAMILY MEDICINE | Facility: CLINIC | Age: 44
End: 2024-11-04
Payer: COMMERCIAL

## 2024-11-04 DIAGNOSIS — J45.909 ASTHMA, UNSPECIFIED ASTHMA SEVERITY, UNSPECIFIED WHETHER COMPLICATED, UNSPECIFIED WHETHER PERSISTENT: Primary | ICD-10-CM

## 2024-11-05 ENCOUNTER — PROCEDURE VISIT (OUTPATIENT)
Dept: RESPIRATORY THERAPY | Facility: HOSPITAL | Age: 44
End: 2024-11-05
Attending: NURSE PRACTITIONER
Payer: COMMERCIAL

## 2024-11-05 DIAGNOSIS — J45.909 ASTHMA, UNSPECIFIED ASTHMA SEVERITY, UNSPECIFIED WHETHER COMPLICATED, UNSPECIFIED WHETHER PERSISTENT: ICD-10-CM

## 2024-11-05 LAB
DLCO SINGLE BREATH LLN: 16.4
DLCO SINGLE BREATH PRE REF: 61.8 %
DLCO SINGLE BREATH REF: 22.13
DLCOC SBVA LLN: 3.49
DLCOC SBVA REF: 5.4
DLCOC SINGLE BREATH LLN: 16.4
DLCOC SINGLE BREATH REF: 22.13
DLCOCSBVAULN: 7.31
DLCOCSINGLEBREATHULN: 27.86
DLCOSINGLEBREATHULN: 27.86
DLCOSINGLEBREATHZSCORE: -2.43
DLCOVA LLN: 3.49
DLCOVA PRE REF: 84.2 %
DLCOVA PRE: 4.55 ML/(MIN*MMHG*L) (ref 3.49–7.31)
DLCOVA REF: 5.4
DLCOVAULN: 7.31
ERVN2 LLN: -16449
ERVN2 PRE REF: 25.6 %
ERVN2 PRE: 0.26 L (ref -16449–16451)
ERVN2 REF: 1
ERVN2ULN: ABNORMAL
FEF 25 75 LLN: 1.93
FEF 25 75 PRE REF: 55.2 %
FEF 25 75 REF: 3.33
FET100 CHG: -10.3 %
FEV05 LLN: 0.98
FEV05 REF: 1.84
FEV1 CHG: -2 %
FEV1 FVC LLN: 71
FEV1 FVC PRE REF: 94.3 %
FEV1 FVC REF: 82
FEV1 LLN: 1.95
FEV1 PRE REF: 79.3 %
FEV1 REF: 2.46
FEV1 VOL CHG: -0.04
FEV1FVCZSCORE: -0.74
FEV1ZSCORE: -1.64
FRCN2 LLN: 1.58
FRCN2 PRE REF: 49.6 %
FRCN2 REF: 2.4
FRCN2ULN: 3.22
FVC CHG: -4.3 %
FVC LLN: 2.38
FVC PRE REF: 83.8 %
FVC REF: 3
FVC VOL CHG: -0.11
FVCZSCORE: -1.28
ICN2REF: 1.67
IVC PRE: 2.15 L
IVC SINGLE BREATH LLN: 2.38
IVC SINGLE BREATH PRE REF: 71.5 %
IVC SINGLE BREATH REF: 3
IVCSINGLEBREATHULN: 3.64
LLN IC N2: -16448.33
PEF LLN: 4.73
PEF PRE REF: 66.1 %
PEF REF: 6.18
PHYSICIAN COMMENT: ABNORMAL
POST FEF 25 75: 1.91 L/S (ref 1.93–4.73)
POST FET 100: 7.17 SEC
POST FEV1 FVC: 79.42 %
POST FEV1: 1.91 L
POST FEV5: 1.29 L (ref 0.98–2.69)
POST FVC: 2.41 L
POST PEF: 2.81 L/S (ref 4.36–7.32)
PRE DLCO: 13.67 ML/(MIN*MMHG) (ref 16.4–27.86)
PRE FEF 25 75: 1.84 L/S (ref 1.93–4.73)
PRE FET 100: 8 SEC
PRE FEV05 REF: 83.2 %
PRE FEV1 FVC: 77.62 %
PRE FEV1: 1.95 L
PRE FEV5: 1.53 L (ref 0.98–2.69)
PRE FRC N2: 1.19 L (ref 1.58–3.22)
PRE FVC: 2.52 L
PRE IC N2: 2.26 L (ref -16448.33–16451.67)
PRE PEF: 4.09 L/S (ref 4.36–7.32)
PRE REF IC N2: 135.1 %
RVN2 LLN: 0.82
RVN2 PRE REF: 66.7 %
RVN2 PRE: 0.93 L (ref 0.82–1.98)
RVN2 REF: 1.4
RVN2TLCN2 LLN: 23.99
RVN2TLCN2 PRE REF: 80.6 %
RVN2TLCN2 PRE: 27.06 % (ref 23.99–43.17)
RVN2TLCN2 REF: 33.58
RVN2TLCN2ULN: 43.17
RVN2ULN: 1.98
TLCN2 LLN: 3.11
TLCN2 PRE REF: 84.1 %
TLCN2 PRE: 3.45 L (ref 3.11–5.09)
TLCN2 REF: 4.1
TLCN2ULN: 5.09
TLCN2ZSCORE: -1.09
ULN IC N2: ABNORMAL
VA PRE: 3.01 L (ref 3.95–3.95)
VA SINGLE BREATH LLN: 3.95
VA SINGLE BREATH PRE REF: 76.1 %
VA SINGLE BREATH REF: 3.95
VASINGLEBREATHULN: 3.95
VCMAXN2 LLN: 2.38
VCMAXN2 PRE REF: 83.8 %
VCMAXN2 PRE: 2.52 L
VCMAXN2 REF: 3
VCMAXN2ULN: 3.64

## 2024-11-05 PROCEDURE — 94727 GAS DIL/WSHOT DETER LNG VOL: CPT

## 2024-11-05 PROCEDURE — 99900031 HC PATIENT EDUCATION (STAT)

## 2024-11-05 PROCEDURE — 94060 EVALUATION OF WHEEZING: CPT

## 2024-11-05 PROCEDURE — 94729 DIFFUSING CAPACITY: CPT

## 2024-11-11 ENCOUNTER — PATIENT MESSAGE (OUTPATIENT)
Dept: FAMILY MEDICINE | Facility: CLINIC | Age: 44
End: 2024-11-11

## 2024-11-11 ENCOUNTER — OFFICE VISIT (OUTPATIENT)
Dept: FAMILY MEDICINE | Facility: CLINIC | Age: 44
End: 2024-11-11
Payer: COMMERCIAL

## 2024-11-11 VITALS
OXYGEN SATURATION: 96 % | HEIGHT: 59 IN | BODY MASS INDEX: 52.21 KG/M2 | RESPIRATION RATE: 16 BRPM | SYSTOLIC BLOOD PRESSURE: 130 MMHG | WEIGHT: 259 LBS | HEART RATE: 78 BPM | DIASTOLIC BLOOD PRESSURE: 74 MMHG | TEMPERATURE: 98 F

## 2024-11-11 DIAGNOSIS — I10 PRIMARY HYPERTENSION: ICD-10-CM

## 2024-11-11 DIAGNOSIS — J98.4 RESTRICTIVE LUNG DISEASE SECONDARY TO OBESITY: Primary | ICD-10-CM

## 2024-11-11 DIAGNOSIS — Z23 NEED FOR INFLUENZA VACCINATION: ICD-10-CM

## 2024-11-11 DIAGNOSIS — M17.11 PRIMARY OSTEOARTHRITIS OF RIGHT KNEE: Primary | ICD-10-CM

## 2024-11-11 DIAGNOSIS — F41.1 GENERALIZED ANXIETY DISORDER: ICD-10-CM

## 2024-11-11 DIAGNOSIS — R60.0 PERIPHERAL EDEMA: ICD-10-CM

## 2024-11-11 DIAGNOSIS — E66.9 RESTRICTIVE LUNG DISEASE SECONDARY TO OBESITY: Primary | ICD-10-CM

## 2024-11-11 DIAGNOSIS — F33.9 RECURRENT MAJOR DEPRESSIVE DISORDER, REMISSION STATUS UNSPECIFIED: ICD-10-CM

## 2024-11-11 DIAGNOSIS — M17.0 PRIMARY OSTEOARTHRITIS OF BOTH KNEES: ICD-10-CM

## 2024-11-11 DIAGNOSIS — J45.909 ASTHMA, UNSPECIFIED ASTHMA SEVERITY, UNSPECIFIED WHETHER COMPLICATED, UNSPECIFIED WHETHER PERSISTENT: ICD-10-CM

## 2024-11-11 DIAGNOSIS — E66.01 MORBID OBESITY DUE TO EXCESS CALORIES: ICD-10-CM

## 2024-11-11 PROBLEM — K21.9 GASTROESOPHAGEAL REFLUX DISEASE: Status: ACTIVE | Noted: 2021-12-01

## 2024-11-11 PROCEDURE — 1159F MED LIST DOCD IN RCRD: CPT | Mod: CPTII,,, | Performed by: NURSE PRACTITIONER

## 2024-11-11 PROCEDURE — 3075F SYST BP GE 130 - 139MM HG: CPT | Mod: CPTII,,, | Performed by: NURSE PRACTITIONER

## 2024-11-11 PROCEDURE — 90656 IIV3 VACC NO PRSV 0.5 ML IM: CPT | Mod: ,,, | Performed by: NURSE PRACTITIONER

## 2024-11-11 PROCEDURE — 1160F RVW MEDS BY RX/DR IN RCRD: CPT | Mod: CPTII,,, | Performed by: NURSE PRACTITIONER

## 2024-11-11 PROCEDURE — 90471 IMMUNIZATION ADMIN: CPT | Mod: ,,, | Performed by: NURSE PRACTITIONER

## 2024-11-11 PROCEDURE — 3078F DIAST BP <80 MM HG: CPT | Mod: CPTII,,, | Performed by: NURSE PRACTITIONER

## 2024-11-11 PROCEDURE — 4010F ACE/ARB THERAPY RXD/TAKEN: CPT | Mod: CPTII,,, | Performed by: NURSE PRACTITIONER

## 2024-11-11 PROCEDURE — 3008F BODY MASS INDEX DOCD: CPT | Mod: CPTII,,, | Performed by: NURSE PRACTITIONER

## 2024-11-11 PROCEDURE — 99214 OFFICE O/P EST MOD 30 MIN: CPT | Mod: 25,,, | Performed by: NURSE PRACTITIONER

## 2024-11-11 PROCEDURE — 3044F HG A1C LEVEL LT 7.0%: CPT | Mod: CPTII,,, | Performed by: NURSE PRACTITIONER

## 2024-11-11 RX ORDER — SEMAGLUTIDE 0.5 MG/.5ML
0.5 INJECTION, SOLUTION SUBCUTANEOUS
Qty: 2 ML | Refills: 1 | Status: SHIPPED | OUTPATIENT
Start: 2024-11-11

## 2024-11-11 RX ORDER — SEMAGLUTIDE 0.25 MG/.5ML
0.25 INJECTION, SOLUTION SUBCUTANEOUS
Qty: 2 ML | Refills: 0 | Status: SHIPPED | OUTPATIENT
Start: 2024-11-11 | End: 2024-11-11

## 2024-11-11 NOTE — ASSESSMENT & PLAN NOTE
Patient currently only on the nifedipine and spironolactone.  Blood pressure good at 130/74.  No longer having significant bilateral lower extremity peripheral edema like she was on the metoprolol.  Encouraged to monitor blood pressure closely at home, may need to restart benazepril now that she is off of the metoprolol.  Patient verbalized understanding.  Follow-up 3 months.

## 2024-11-11 NOTE — PROGRESS NOTES
Subjective:       Patient ID: Malia Schroeder is a 44 y.o. female.    Chief Complaint: Asthma (Follow Up)      Shortness of Breath  This is a recurrent problem. The current episode started more than 1 month ago. The problem occurs daily. The problem has been waxing and waning. The average episode lasts 5 minutes. Associated symptoms include claudication, leg swelling, orthopnea, PND and wheezing. Pertinent negatives include no abdominal pain, chest pain, coryza, ear pain, fever, headaches, hemoptysis, leg pain, neck pain, rash, rhinorrhea, sore throat, sputum production, syncope or vomiting. The symptoms are aggravated by exercise, any activity and lying flat. The patient has no known risk factors for DVT/PE. She has tried beta agonist inhalers for the symptoms. The treatment provided moderate relief. Her past medical history is significant for allergies and asthma. There is no history of aspirin allergies, bronchiolitis, CAD, chronic lung disease, COPD, DVT, a heart failure, PE, pneumonia or a recent surgery.      This is a 44-year-old white female who presents to clinic today to discuss recent shortness a breath.  She saw cardiology and had full workup which was all negative.  Went do PFT in his here for results.  Recently had a big change in job, she previously stayed home and baby sat children now, she is working for the [employer] again in the [employer] house going up and downstairs, having to do training at the [employer] range, on her feet all day.  Has been having increased shortness a breath, bilateral knee pain.  Went get an injection in her knee couple weeks ago which helped some.  She is also here for follow-up of her anxiety, depression, hypertension.  Reports that cardiology change in blood pressure medications, blood pressure was low and she let me know and we stopped the benazepril.  Was having a lot of swelling since starting the metoprolol.  Has since stopped the metoprolol and no longer  having the swelling.  Review of Systems   Constitutional:  Negative for fever.   HENT:  Negative for ear pain, rhinorrhea and sore throat.    Respiratory:  Positive for shortness of breath and wheezing. Negative for hemoptysis and sputum production.    Cardiovascular:  Positive for orthopnea, claudication, leg swelling and PND. Negative for chest pain and syncope.   Gastrointestinal:  Negative for abdominal pain and vomiting.   Musculoskeletal:  Negative for leg pain and neck pain.   Integumentary:  Negative for rash.   Neurological:  Negative for headaches.     Comprehensive review of systems negative except as stated in HPI    The patient's Health Maintenance was reviewed and the following appears to be due:   There are no preventive care reminders to display for this patient.    Past Medical History:  Past Medical History:   Diagnosis Date    Allergies     Anxiety disorder, unspecified     Chronic sinusitis, unspecified     Depression     GERD (gastroesophageal reflux disease)     Hyperlipidemia     Hypertension     Hypokalemia     Iron deficiency anemia, unspecified     Mild intermittent asthma, uncomplicated     Mild intermittent asthma, uncomplicated     Obstructive sleep apnea     Peripheral edema      Past Surgical History:   Procedure Laterality Date    CHOLECYSTECTOMY      HYSTERECTOMY      PLANTAR FASCIA RELEASE      SINUS SURGERY      TARSAL TUNNEL RELEASE      TONSILLECTOMY       Review of patient's allergies indicates:   Allergen Reactions    Hydrocodone-acetaminophen Hives    Promethazine Anxiety     Current Outpatient Medications on File Prior to Visit   Medication Sig Dispense Refill    ALPRAZolam (XANAX) 1 MG tablet TAKE ONE (1) TABLET BY MOUTH TWICE A DAY FOR ANXIETY 60 tablet 2    ARIPiprazole (ABILIFY) 5 MG Tab Take 1 tablet (5 mg total) by mouth once daily. 30 tablet 11    busPIRone (BUSPAR) 30 MG Tab Take 1 tablet (30 mg total) by mouth 3 (three) times daily. 90 tablet 11    diclofenac  (VOLTAREN) 75 MG EC tablet Take 1 tablet (75 mg total) by mouth 2 (two) times daily. Take with food 60 tablet 1    montelukast (SINGULAIR) 10 mg tablet Take 1 tablet (10 mg total) by mouth every evening. 30 tablet 11    NIFEdipine (PROCARDIA-XL) 60 MG (OSM) 24 hr tablet TAKE 1 TABLET (60 MG TOTAL) BY MOUTH ONCE DAILY FOR BLOOD PRESSURE 30 tablet 11    pantoprazole (PROTONIX) 40 MG tablet TAKE 1 TABLET BY MOUTH TWICE DAILY FOR ACID REFLUX. 60 tablet 5    potassium chloride (MICRO-K) 10 MEQ CpSR Take 2 capsules (20 mEq total) by mouth once daily. 60 capsule 11    pravastatin (PRAVACHOL) 40 MG tablet Take 1 tablet (40 mg total) by mouth once daily. 30 tablet 11    spironolactone (ALDACTONE) 50 MG tablet TAKE 1 TABLET BY MOUTH ONCE DAILY. 30 tablet 11    vilazodone (VIIBRYD) 40 mg Tab tablet TAKE ONE (1) TABLET (40 MG TOTAL) BY MOUTH ONCE DAILY FOR DEPRESSION/MOOD 30 tablet 11    [DISCONTINUED] benazepriL (LOTENSIN) 10 MG tablet TAKE 1 TABLET BY MOUTH TWICE A DAY FOR BLOOD PRESSURE 60 tablet 11     No current facility-administered medications on file prior to visit.     Social History     Socioeconomic History    Marital status: Single   Tobacco Use    Smoking status: Some Days     Current packs/day: 0.00     Types: Cigarettes    Smokeless tobacco: Never   Substance and Sexual Activity    Alcohol use: Not Currently     Comment: socially    Drug use: Never    Sexual activity: Not Currently     Social Drivers of Health     Financial Resource Strain: Low Risk  (11/27/2023)    Overall Financial Resource Strain (CARDIA)     Difficulty of Paying Living Expenses: Not hard at all   Food Insecurity: No Food Insecurity (11/27/2023)    Hunger Vital Sign     Worried About Running Out of Food in the Last Year: Never true     Ran Out of Food in the Last Year: Never true   Transportation Needs: No Transportation Needs (11/27/2023)    PRAPARE - Transportation     Lack of Transportation (Medical): No     Lack of Transportation  "(Non-Medical): No   Physical Activity: Inactive (11/27/2023)    Exercise Vital Sign     Days of Exercise per Week: 0 days     Minutes of Exercise per Session: 0 min   Stress: Stress Concern Present (11/27/2023)    [employer] - Occupational Stress Questionnaire     Feeling of Stress : To some extent   Housing Stability: Low Risk  (11/27/2023)    Housing Stability Vital Sign     Unable to Pay for Housing in the Last Year: No     Number of Places Lived in the Last Year: 1     Unstable Housing in the Last Year: No     Family History   Problem Relation Name Age of Onset    Hypertension Mother Keyla Schroeder     Dementia Mother Keyla Schroeder     Arthritis Mother Keyla Schroeder     Cancer Father Sami Schroeder         Non hodgekins lymphoma       Objective:       /74 (BP Location: Left arm)   Pulse 78   Temp 98.2 °F (36.8 °C) (Oral)   Resp 16   Ht 4' 11" (1.499 m)   Wt 117.5 kg (259 lb)   SpO2 96%   BMI 52.31 kg/m²      Physical Exam  Vitals and nursing note reviewed.   Constitutional:       Appearance: Normal appearance. She is obese.   HENT:      Head: Normocephalic and atraumatic.      Right Ear: Tympanic membrane, ear canal and external ear normal.      Left Ear: Tympanic membrane, ear canal and external ear normal.      Nose: Nose normal.      Mouth/Throat:      Mouth: Mucous membranes are moist.      Pharynx: Oropharynx is clear.   Eyes:      Extraocular Movements: Extraocular movements intact.      Conjunctiva/sclera: Conjunctivae normal.      Pupils: Pupils are equal, round, and reactive to light.   Cardiovascular:      Rate and Rhythm: Normal rate and regular rhythm.      Heart sounds: Normal heart sounds.   Pulmonary:      Effort: Pulmonary effort is normal.      Breath sounds: Normal breath sounds.   Musculoskeletal:         General: Normal range of motion.      Cervical back: Normal range of motion and neck supple.   Skin:     General: Skin is warm and dry.   Neurological:    "   General: No focal deficit present.      Mental Status: She is alert and oriented to person, place, and time.   Psychiatric:         Mood and Affect: Mood normal.         Behavior: Behavior normal.         Thought Content: Thought content normal.         Judgment: Judgment normal.         Labs  Procedure visit on 11/05/2024   Component Date Value Ref Range Status    Physician Comment 11/05/2024    Final                    Value:FEV1, FVC, and FEV1/FVC normal. TLC normal, ERV decreased. DLCO decreased.  Â   Impression:  No measured obstruction or restriction. Decreased ERV likely due to changes related to obesity. Decreased DLCO suggest altered pulmonary gas exchange surface.      Post FVC 11/05/2024 2.41  L Final    Post FEV5 11/05/2024 1.29  0.98 - 2.69 L Final    Post FEV1 11/05/2024 1.91  L Final    Post FEV1 FVC 11/05/2024 79.42  % Final    Post FEF 25 75 11/05/2024 1.91 (L)  1.93 - 4.73 L/s Final    Post PEF 11/05/2024 2.81 (L)  4.36 - 7.32 L/s Final    Post  11/05/2024 7.17  sec Final    Pre DLCO 11/05/2024 13.67 (L)  16.40 - 27.86 ml/(min*mmHg) Final    DLCOVA PRE 11/05/2024 4.55  3.49 - 7.31 ml/(min*mmHg*L) Final    VA PRE 11/05/2024 3.01 (L)  3.95 - 3.95 L Final    IVC PRE 11/05/2024 2.15  L Final    TLCN2 PRE 11/05/2024 3.45  3.11 - 5.09 L Final    VCMAXN2 PRE 11/05/2024 2.52  L Final    PRE IC N2 11/05/2024 2.26  -67465.33 - 18825.67 L Final    Pre FRC N2 11/05/2024 1.19 (L)  1.58 - 3.22 L Final    ERVN2 PRE 11/05/2024 0.26  -52077.00 - 99366.00 L Final    RVN2 PRE 11/05/2024 0.93  0.82 - 1.98 L Final    YDE7CCES2 PRE 11/05/2024 27.06  23.99 - 43.17 % Final    Pre FVC 11/05/2024 2.52  L Final    PRE FEV5 11/05/2024 1.53  0.98 - 2.69 L Final    Pre FEV1 11/05/2024 1.95  L Final    Pre FEV1 FVC 11/05/2024 77.62  % Final    Pre FEF 25 75 11/05/2024 1.84 (L)  1.93 - 4.73 L/s Final    Pre PEF 11/05/2024 4.09 (L)  4.36 - 7.32 L/s Final    Pre  11/05/2024 8.00  sec Final    FVC Ref 11/05/2024 3.00    Final    FVC LLN 11/05/2024 2.38   Final    FVC Pre Ref 11/05/2024 83.8  % Final    FEV05 REF 11/05/2024 1.84   Final    FEV05 LLN 11/05/2024 0.98   Final    PRE FEV05 REF 11/05/2024 83.2  % Final    FEV1 Ref 11/05/2024 2.46   Final    FEV1 LLN 11/05/2024 1.95   Final    FEV1 Pre Ref 11/05/2024 79.3  % Final    FEV1 FVC Ref 11/05/2024 82   Final    FEV1 FVC LLN 11/05/2024 71   Final    FEV1 FVC Pre Ref 11/05/2024 94.3  % Final    FEF 25 75 Ref 11/05/2024 3.33   Final    FEF 25 75 LLN 11/05/2024 1.93   Final    FEF 25 75 Pre Ref 11/05/2024 55.2  % Final    PEF Ref 11/05/2024 6.18   Final    PEF LLN 11/05/2024 4.73   Final    PEF Pre Ref 11/05/2024 66.1  % Final    FVC Chg 11/05/2024 -4.3  % Final    FEV1 Chg 11/05/2024 -2.0  % Final    [id number] Chg 11/05/2024 -10.3  % Final    FVC VOL CHG 11/05/2024 -0.11   Final    FEV1 VOL CHG 11/05/2024 -0.04   Final    TLCN2 Ref 11/05/2024 4.10   Final    TLCN2 LLN 11/05/2024 3.11   Final    TLC N2 ULN 11/05/2024 5.09   Final    TLCN2 Pre Ref 11/05/2024 84.1  % Final    VCMAXN2 Ref 11/05/2024 3.00   Final    VCMAXN2 LLN 11/05/2024 2.38   Final    VCMAX N2 ULN 11/05/2024 3.64   Final    VCMAXN2 Pre Ref 11/05/2024 83.8  % Final    UPR9NVK 11/05/2024 1.67   Final    LLN IC N2 11/05/2024 -71644.33   Final    ULN IC N2 11/05/2024 14901.67   Final    PRE REF IC N2 11/05/2024 135.1  % Final    FRCN2 Ref 11/05/2024 2.40   Final    FRCN2 LLN 11/05/2024 1.58   Final    FRC N2 ULN 11/05/2024 3.22   Final    FRCN2 Pre Ref 11/05/2024 49.6  % Final    ERVN2 Ref 11/05/2024 1.00   Final    ERVN2 LLN 11/05/2024 -55837.00   Final    ERV N2 ULN 11/05/2024 59368.00   Final    ERVN2 Pre Ref 11/05/2024 25.6  % Final    RVN2 Ref 11/05/2024 1.40   Final    RVN2 LLN 11/05/2024 0.82   Final    RV N2 ULN 11/05/2024 1.98   Final    RVN2 Pre Ref 11/05/2024 66.7  % Final    [id number] Ref 11/05/2024 33.58   Final    [id number] LLN 11/05/2024 23.99   Final    RV N2 TLC N2 ULN 11/05/2024 43.17   Final     MKX4PHFB8 Pre Ref 11/05/2024 80.6  % Final    DLCO Single Breath Ref 11/05/2024 22.13   Final    DLCO Single Breath LLN 11/05/2024 16.40   Final    DLCO SINGLEBREATH ULN 11/05/2024 27.86   Final    DLCO Single Breath Pre Ref 11/05/2024 61.8  % Final    DLCOc Single Breath Ref 11/05/2024 22.13   Final    DLCOc Single Breath LLN 11/05/2024 16.40   Final    DLCOC SINGLEBREATH ULN 11/05/2024 27.86   Final    DLCOVA Ref 11/05/2024 5.40   Final    DLCOVA LLN 11/05/2024 3.49   Final    DLCOVA ULN 11/05/2024 7.31   Final    DLCOVA Pre Ref 11/05/2024 84.2  % Final    DLCOc SBVA Ref 11/05/2024 5.40   Final    DLCOc SBVA LLN 11/05/2024 3.49   Final    DLCOCSBVA ULN 11/05/2024 7.31   Final    VA Single Breath Ref 11/05/2024 3.95   Final    VA Single Breath LLN 11/05/2024 3.95   Final    VA SINGLEBREATH ULN 11/05/2024 3.95   Final    VA Single Breath Pre Ref 11/05/2024 76.1  % Final    IVC Single Breath Ref 11/05/2024 3.00   Final    IVC Single Breath LLN 11/05/2024 2.38   Final    IVC SINGLEBREATH ULN 11/05/2024 3.64   Final    IVC Single Breath Pre Ref 11/05/2024 71.5  % Final    FVCZSCORE 11/05/2024 -1.28   Final    [id number] 11/05/2024 -1.64   Final    RBO8GILRNHSVN 11/05/2024 -0.74   Final    LTHH3FWKOCZ 11/05/2024 -1.09   Final    DLCOSINGLEBREATHZSCORE 11/05/2024 -2.43   Final   Clinical Support on 10/10/2024   Component Date Value Ref Range Status    Sodium 10/10/2024 138  136 - 145 mmol/L Final    Potassium 10/10/2024 4.1  3.5 - 5.1 mmol/L Final    Chloride 10/10/2024 103  98 - 107 mmol/L Final    CO2 10/10/2024 25  22 - 29 mmol/L Final    Glucose 10/10/2024 94  74 - 100 mg/dL Final    Blood Urea Nitrogen 10/10/2024 16.2  7.0 - 18.7 mg/dL Final    Creatinine 10/10/2024 0.79  0.55 - 1.02 mg/dL Final    BUN/Creatinine Ratio 10/10/2024 21   Final    Calcium 10/10/2024 9.4  8.4 - 10.2 mg/dL Final    Anion Gap 10/10/2024 10.0  mEq/L Final    eGFR 10/10/2024 >60  mL/min/1.73/m2 Final   Lab Visit on 09/25/2024   Component  Date Value Ref Range Status    TSH 09/25/2024 1.391  0.350 - 4.940 uIU/mL Final   Admission on 09/22/2024, Discharged on 09/22/2024   Component Date Value Ref Range Status    QRS Duration 09/22/2024 86  ms Final    [facility] QTC Calculation 09/22/2024 426  ms Final    Sodium 09/22/2024 140  136 - 145 mmol/L Final    Potassium 09/22/2024 3.0 (L)  3.5 - 5.1 mmol/L Final    Chloride 09/22/2024 106  98 - 107 mmol/L Final    CO2 09/22/2024 21 (L)  22 - 29 mmol/L Final    Glucose 09/22/2024 137 (H)  74 - 100 mg/dL Final    Blood Urea Nitrogen 09/22/2024 11.7  7.0 - 18.7 mg/dL Final    Creatinine 09/22/2024 0.97  0.55 - 1.02 mg/dL Final    Calcium 09/22/2024 10.1  8.4 - 10.2 mg/dL Final    Protein Total 09/22/2024 8.5 (H)  6.4 - 8.3 gm/dL Final    Albumin 09/22/2024 4.2  3.5 - 5.0 g/dL Final    Globulin 09/22/2024 4.3 (H)  2.4 - 3.5 gm/dL Final    Albumin/Globulin Ratio 09/22/2024 1.0 (L)  1.1 - 2.0 ratio Final    Bilirubin Total 09/22/2024 0.4  <=1.5 mg/dL Final    ALP 09/22/2024 155 (H)  40 - 150 unit/L Final    ALT 09/22/2024 24  0 - 55 unit/L Final    AST 09/22/2024 18  5 - 34 unit/L Final    eGFR 09/22/2024 >60  mL/min/1.73/m2 Final    Anion Gap 09/22/2024 13.0  mEq/L Final    BUN/Creatinine Ratio 09/22/2024 12   Final    WBC 09/22/2024 10.08  4.50 - 11.50 x10(3)/mcL Final    RBC 09/22/2024 4.61  4.20 - 5.40 x10(6)/mcL Final    Hgb 09/22/2024 13.8  12.0 - 16.0 g/dL Final    Hct 09/22/2024 41.3  37.0 - 47.0 % Final    MCV 09/22/2024 89.6  80.0 - 94.0 fL Final    MCH 09/22/2024 29.9  27.0 - 31.0 pg Final    MCHC 09/22/2024 33.4  33.0 - 36.0 g/dL Final    RDW 09/22/2024 13.0  11.5 - 17.0 % Final    Platelet 09/22/2024 295  130 - 400 x10(3)/mcL Final    MPV 09/22/2024 9.3  7.4 - 10.4 fL Final    Neut % 09/22/2024 56.8  % Final    Lymph % 09/22/2024 31.3  % Final    Mono % 09/22/2024 6.0  % Final    Eos % 09/22/2024 4.9  % Final    Basophil % 09/22/2024 0.4  % Final    Lymph # 09/22/2024 3.16  0.6 - 4.6 x10(3)/mcL Final    Neut  # 09/22/2024 5.73  2.1 - 9.2 x10(3)/mcL Final    Mono # 09/22/2024 0.60  0.1 - 1.3 x10(3)/mcL Final    Eos # 09/22/2024 0.49  0 - 0.9 x10(3)/mcL Final    Baso # 09/22/2024 0.04  <=0.2 x10(3)/mcL Final    IG# 09/22/2024 0.06 (H)  0 - 0.04 x10(3)/mcL Final    IG% 09/22/2024 0.6  % Final    Troponin-I 09/22/2024 <0.010  0.000 - 0.045 ng/mL Final   Lab Visit on 05/24/2024   Component Date Value Ref Range Status    Sodium 05/24/2024 142  136 - 145 mmol/L Final    Potassium 05/24/2024 3.9  3.5 - 5.1 mmol/L Final    Chloride 05/24/2024 105  98 - 107 mmol/L Final    CO2 05/24/2024 26  22 - 29 mmol/L Final    Glucose 05/24/2024 102 (H)  74 - 100 mg/dL Final    Blood Urea Nitrogen 05/24/2024 23.1 (H)  7.0 - 18.7 mg/dL Final    Creatinine 05/24/2024 0.86  0.55 - 1.02 mg/dL Final    Calcium 05/24/2024 9.6  8.4 - 10.2 mg/dL Final    Protein Total 05/24/2024 7.3  6.4 - 8.3 gm/dL Final    Albumin 05/24/2024 3.6  3.5 - 5.0 g/dL Final    Globulin 05/24/2024 3.7 (H)  2.4 - 3.5 gm/dL Final    Albumin/Globulin Ratio 05/24/2024 1.0 (L)  1.1 - 2.0 ratio Final    Bilirubin Total 05/24/2024 0.4  <=1.5 mg/dL Final    ALP 05/24/2024 152 (H)  40 - 150 unit/L Final    ALT 05/24/2024 24  0 - 55 unit/L Final    AST 05/24/2024 17  5 - 34 unit/L Final    eGFR 05/24/2024 >60  mL/min/1.73/m2 Final    Anion Gap 05/24/2024 11.0  mEq/L Final    BUN/Creatinine Ratio 05/24/2024 27   Final    Cholesterol Total 05/24/2024 159  <=200 mg/dL Final    HDL Cholesterol 05/24/2024 31 (L)  35 - 60 mg/dL Final    Triglyceride 05/24/2024 353 (H)  37 - 140 mg/dL Final    Cholesterol/HDL Ratio 05/24/2024 5  0 - 5 Final    Very Low Density Lipoprotein 05/24/2024 71   Final    LDL Cholesterol 05/24/2024 57.00  50.00 - 140.00 mg/dL Final    TSH 05/24/2024 0.967  0.350 - 4.940 uIU/mL Final    Hemoglobin A1c 05/24/2024 4.8  <=7.0 % Final    Estimated Average Glucose 05/24/2024 91.1  mg/dL Final    WBC 05/24/2024 8.38  4.50 - 11.50 x10(3)/mcL Final    RBC 05/24/2024 4.29   4.20 - 5.40 x10(6)/mcL Final    Hgb 05/24/2024 12.7  12.0 - 16.0 g/dL Final    Hct 05/24/2024 38.9  37.0 - 47.0 % Final    MCV 05/24/2024 90.7  80.0 - 94.0 fL Final    MCH 05/24/2024 29.6  27.0 - 31.0 pg Final    MCHC 05/24/2024 32.6 (L)  33.0 - 36.0 g/dL Final    RDW 05/24/2024 13.5  11.5 - 17.0 % Final    Platelet 05/24/2024 240  130 - 400 x10(3)/mcL Final    MPV 05/24/2024 8.8  7.4 - 10.4 fL Final    Neut % 05/24/2024 66.3  % Final    Lymph % 05/24/2024 25.1  % Final    Mono % 05/24/2024 5.0  % Final    Eos % 05/24/2024 3.0  % Final    Basophil % 05/24/2024 0.1  % Final    Lymph # 05/24/2024 2.10  0.6 - 4.6 x10(3)/mcL Final    Neut # 05/24/2024 5.56  2.1 - 9.2 x10(3)/mcL Final    Mono # 05/24/2024 0.42  0.1 - 1.3 x10(3)/mcL Final    Eos # 05/24/2024 0.25  0 - 0.9 x10(3)/mcL Final    Baso # 05/24/2024 0.01  <=0.2 x10(3)/mcL Final    IG# 05/24/2024 0.04  0 - 0.04 x10(3)/mcL Final    IG% 05/24/2024 0.5  % Final       Assessment and Plan       ICD-10-CM ICD-9-CM   1. Restrictive lung disease secondary to obesity  J98.4 518.89    E66.9 278.00   2. Morbid obesity due to excess calories  E66.01 278.01   3. Need for influenza vaccination  Z23 V04.81   4. Asthma, unspecified asthma severity, unspecified whether complicated, unspecified whether persistent  J45.909 493.90   5. Generalized anxiety disorder  F41.1 300.02   6. Recurrent major depressive disorder, remission status unspecified  F33.9 296.30   7. Primary hypertension  I10 401.9   8. Peripheral edema  R60.0 782.3   9. Primary osteoarthritis of both knees  M17.0 715.16        1. Restrictive lung disease secondary to obesity  Overview:  11/05/2024 - PFT - Impression:   No measured obstruction or restriction. Decreased ERV likely due to changes related to obesity. Decreased DLCO suggest altered pulmonary gas exchange surface.     Assessment & Plan:  Previous diagnosis of asthma but never had PFT before.  PFT showing restrictive due to obesity.  No asthma.  Recommend  weight loss.  Will start her on management for obesity.      2. Morbid obesity due to excess calories  Overview:  11/11/2024 - 259 lb, BMI 52.31, start Wegovy    Assessment & Plan:  Patient with significant weight related issue such as exacerbation of bilateral knee osteoarthritis as well as obesity related restrictive lung disease.  Recommend calorie deficit diet along with daily exercise of at least 30 minutes per day.  Start Wegovy 0.25 mg weekly and will titrate to maintenance dose.  Follow-up in 3 months.    Orders:  -     Discontinue: semaglutide, weight loss, (WEGOVY) 0.25 mg/0.5 mL PnIj; Inject 0.25 mg into the skin every 7 days.  Dispense: 2 mL; Refill: 0  -     semaglutide, weight loss, (WEGOVY) 0.5 mg/0.5 mL PnIj; Inject 0.5 mg into the skin every 7 days.  Dispense: 2 mL; Refill: 1    3. Need for influenza vaccination  -     influenza (Flulaval, Fluzone, Fluarix) 45 mcg/0.5 mL IM vaccine (> or = 6 mo) 0.5 mL    4. Asthma, unspecified asthma severity, unspecified whether complicated, unspecified whether persistent    5. Generalized anxiety disorder  Overview:  Xanax 1 mg BID prn     Effexor - did not work, made her feel worse  Zoloft > 10 years, DC on 10/15/2021  10/15/2021 - DC Zoloft 200 mg daily, start Viibryd 20 mg daily   02/03/2022 - Viibryd 40 mg daily   03/09/2023 - add BuSpar 15 mg twice daily  11/28/2023 - BuSpar 30 mg twice daily  02/07/2024 - BuSpar 30 mg 3 times daily    Assessment & Plan:  Stable, continue Xanax and BuSpar, follow-up 3 months.      6. Recurrent major depressive disorder, remission status unspecified  Overview:  Effexor - did not work, made her feel worse  Zoloft > 10 years, DC on 10/15/2021  10/15/2021 - DC Zoloft 200 mg daily, start Viibryd 20 mg daily   02/03/2022 - Viibryd 40 mg daily   06/06/2024 - add Abilify 2 mg daily  06/24/2024 - Abilify 5 mg daily    Assessment & Plan:  Stable, continue Viibryd and Abilify, follow-up 3 months.      7. Primary  hypertension  Overview:  DX by previous provider, initially on benazepril 10 mg daily   09/17/2021 - benazepril 10 mg BID  11/17/2021 - add nifedipine 30 mg daily  11/22/2021 - increase nifedipine to 60 mg daily   12/02/2021 - spironolactone 50 mg daily added per Dr Saldivar  09/25/2024 - metoprolol 50 mg daily added per Cardiology  10/24/2024 - discontinue benazepril due to hypotension  11/06/2024 - discontinue metoprolol due to peripheral edema      Assessment & Plan:  Patient currently only on the nifedipine and spironolactone.  Blood pressure good at 130/74.  No longer having significant bilateral lower extremity peripheral edema like she was on the metoprolol.  Encouraged to monitor blood pressure closely at home, may need to restart benazepril now that she is off of the metoprolol.  Patient verbalized understanding.  Follow-up 3 months.      8. Peripheral edema  Assessment & Plan:  Significantly improved since stopping the metoprolol.  Continue with compression stockings as needed, elevate legs when at rest.      9. Primary osteoarthritis of both knees  Overview:  [facility]    Assessment & Plan:  Somewhat improved after steroid injection to the right knee on 10/24/2024.  Recommend continue follow-up with Orthopedics, also recommend weight loss.  Will start weight management with Wegovy.             Follow up in about 3 months (around 2/11/2025) for follow up.

## 2024-11-11 NOTE — ASSESSMENT & PLAN NOTE
Previous diagnosis of asthma but never had PFT before.  PFT showing restrictive due to obesity.  No asthma.  Recommend weight loss.  Will start her on management for obesity.

## 2024-11-11 NOTE — ASSESSMENT & PLAN NOTE
Patient with significant weight related issue such as exacerbation of bilateral knee osteoarthritis as well as obesity related restrictive lung disease.  Recommend calorie deficit diet along with daily exercise of at least 30 minutes per day.  Start Wegovy 0.25 mg weekly and will titrate to maintenance dose.  Follow-up in 3 months.

## 2024-11-11 NOTE — ASSESSMENT & PLAN NOTE
Significantly improved since stopping the metoprolol.  Continue with compression stockings as needed, elevate legs when at rest.

## 2024-11-11 NOTE — ASSESSMENT & PLAN NOTE
Somewhat improved after steroid injection to the right knee on 10/24/2024.  Recommend continue follow-up with Orthopedics, also recommend weight loss.  Will start weight management with Wegovy.

## 2024-12-03 ENCOUNTER — OFFICE VISIT (OUTPATIENT)
Dept: ORTHOPEDICS | Facility: CLINIC | Age: 44
End: 2024-12-03
Payer: COMMERCIAL

## 2024-12-03 VITALS
BODY MASS INDEX: 52.23 KG/M2 | WEIGHT: 259.06 LBS | DIASTOLIC BLOOD PRESSURE: 77 MMHG | SYSTOLIC BLOOD PRESSURE: 139 MMHG | HEIGHT: 59 IN | HEART RATE: 112 BPM

## 2024-12-03 DIAGNOSIS — J32.0 CHRONIC MAXILLARY SINUSITIS: ICD-10-CM

## 2024-12-03 DIAGNOSIS — E78.5 HYPERLIPIDEMIA, UNSPECIFIED HYPERLIPIDEMIA TYPE: ICD-10-CM

## 2024-12-03 DIAGNOSIS — M17.11 PRIMARY OSTEOARTHRITIS OF RIGHT KNEE: Primary | ICD-10-CM

## 2024-12-03 DIAGNOSIS — I10 HYPERTENSION, UNSPECIFIED TYPE: ICD-10-CM

## 2024-12-03 DIAGNOSIS — K21.9 GASTROESOPHAGEAL REFLUX DISEASE, UNSPECIFIED WHETHER ESOPHAGITIS PRESENT: ICD-10-CM

## 2024-12-03 DIAGNOSIS — E87.6 HYPOKALEMIA: ICD-10-CM

## 2024-12-03 PROCEDURE — 1159F MED LIST DOCD IN RCRD: CPT | Mod: CPTII,,, | Performed by: PHYSICIAN ASSISTANT

## 2024-12-03 PROCEDURE — 1160F RVW MEDS BY RX/DR IN RCRD: CPT | Mod: CPTII,,, | Performed by: PHYSICIAN ASSISTANT

## 2024-12-03 PROCEDURE — 99213 OFFICE O/P EST LOW 20 MIN: CPT | Mod: ,,, | Performed by: PHYSICIAN ASSISTANT

## 2024-12-03 PROCEDURE — 4010F ACE/ARB THERAPY RXD/TAKEN: CPT | Mod: CPTII,,, | Performed by: PHYSICIAN ASSISTANT

## 2024-12-03 PROCEDURE — 3008F BODY MASS INDEX DOCD: CPT | Mod: CPTII,,, | Performed by: PHYSICIAN ASSISTANT

## 2024-12-03 PROCEDURE — 3044F HG A1C LEVEL LT 7.0%: CPT | Mod: CPTII,,, | Performed by: PHYSICIAN ASSISTANT

## 2024-12-03 PROCEDURE — 3075F SYST BP GE 130 - 139MM HG: CPT | Mod: CPTII,,, | Performed by: PHYSICIAN ASSISTANT

## 2024-12-03 PROCEDURE — 3078F DIAST BP <80 MM HG: CPT | Mod: CPTII,,, | Performed by: PHYSICIAN ASSISTANT

## 2024-12-03 RX ORDER — PRAVASTATIN SODIUM 40 MG/1
TABLET ORAL
Qty: 30 TABLET | Refills: 11 | Status: SHIPPED | OUTPATIENT
Start: 2024-12-03

## 2024-12-03 RX ORDER — POTASSIUM CHLORIDE 750 MG/1
CAPSULE, EXTENDED RELEASE ORAL
Qty: 60 CAPSULE | Refills: 11 | Status: SHIPPED | OUTPATIENT
Start: 2024-12-03

## 2024-12-03 RX ORDER — PANTOPRAZOLE SODIUM 40 MG/1
TABLET, DELAYED RELEASE ORAL
Qty: 60 TABLET | Refills: 5 | Status: SHIPPED | OUTPATIENT
Start: 2024-12-03

## 2024-12-03 RX ORDER — SPIRONOLACTONE 50 MG/1
TABLET, FILM COATED ORAL
Qty: 30 TABLET | Refills: 11 | Status: SHIPPED | OUTPATIENT
Start: 2024-12-03

## 2024-12-03 RX ORDER — MONTELUKAST SODIUM 10 MG/1
TABLET ORAL
Qty: 30 TABLET | Refills: 11 | Status: SHIPPED | OUTPATIENT
Start: 2024-12-03

## 2024-12-03 NOTE — PROGRESS NOTES
Chief Complaint:   Chief Complaint   Patient presents with    Injections     F/u right knee injection. States injection helped for a couple of days and pain was back. Pain is not going away.       History of present illness:    This is a 44 y.o. year old female who complains of follow up for right knee pain.    Patient had an injection in his last appointment which gave her a few days to a week's worth of relief but her pain has gradually returned to the point it was prior to the injection.    She was wondering to see about viscosupplementation injections that she fell the cortisone injection but she has a do a 6 week course of physical therapy prior to get an authorization for viscosupplement injection in the right knee.    Patient reports knee is still swollen and medially is the main source of her pain      Current Outpatient Medications   Medication Sig    ALPRAZolam (XANAX) 1 MG tablet TAKE ONE (1) TABLET BY MOUTH TWICE A DAY FOR ANXIETY    ARIPiprazole (ABILIFY) 5 MG Tab Take 1 tablet (5 mg total) by mouth once daily.    busPIRone (BUSPAR) 30 MG Tab Take 1 tablet (30 mg total) by mouth 3 (three) times daily.    diclofenac (VOLTAREN) 75 MG EC tablet Take 1 tablet (75 mg total) by mouth 2 (two) times daily. Take with food    montelukast (SINGULAIR) 10 mg tablet TAKE ONE (1) TABLET (10 MG TOTAL) BY MOUTH EVERY EVENING.    NIFEdipine (PROCARDIA-XL) 60 MG (OSM) 24 hr tablet TAKE 1 TABLET (60 MG TOTAL) BY MOUTH ONCE DAILY FOR BLOOD PRESSURE    pantoprazole (PROTONIX) 40 MG tablet TAKE ONE (1) TABLET BY MOUTH TWICE DAILY FOR ACID REFLUX.    potassium chloride (MICRO-K) 10 MEQ CpSR TAKE TWO (2) CAPSULES (20 MEQ TOTAL) BY MOUTH ONCE DAILY.    pravastatin (PRAVACHOL) 40 MG tablet TAKE ONE (1) TABLET (40 MG TOTAL) BY MOUTH ONCE DAILY.    semaglutide, weight loss, (WEGOVY) 0.5 mg/0.5 mL PnIj Inject 0.5 mg into the skin every 7 days.    spironolactone (ALDACTONE) 50 MG tablet TAKE ONE (1) TABLET BY MOUTH ONCE DAILY.     "vilazodone (VIIBRYD) 40 mg Tab tablet TAKE ONE (1) TABLET (40 MG TOTAL) BY MOUTH ONCE DAILY FOR DEPRESSION/MOOD     No current facility-administered medications for this visit.       Review of Systems:    Constitution:   Denies chills, fever, and sweats.  HENT:   Denies headaches or blurry vision.  Cardiovascular:  Denies chest pain or irregular heart beat.  Respiratory:   Denies cough or shortness of breath.  Gastrointestinal:  Denies abdominal pain, nausea, or vomiting.  Musculoskeletal:   Denies muscle cramps.  Neurological:   Denies dizziness or focal weakness.  Psychiatric/Behavior: Normal mental status.  Hematology/Lymph:  Denies bleeding problem or easy bruising/bleeding.  Skin:    Denies rash or suspicious lesions.    Examination:    Vital Signs:    Vitals:    12/03/24 0939   BP: 139/77   Pulse: (!) 112   Weight: 117.5 kg (259 lb 0.7 oz)   Height: 4' 11" (1.499 m)       Body mass index is 52.32 kg/m².    Constitution:   Well-developed, well nourished patient in no acute distress.  Neurological:   Alert and oriented x 3 and cooperative to examination.     Psychiatric/Behavior: Normal mental status.  Respiratory:   No shortness of breath.  Eyes:    Extraoccular muscles intact  Skin:    No scars, rash or suspicious lesions.    Physical Exam:   Right Knee     Grade effusion 2    No erythema, warmth bruising     active flexion 110   active extension 5    Tender over medial joint line  Tender over MCL   No lateral compartment tenderness   Negative  Leda test   Negative  lachman test   No instability on varus or valgus stress   No weakness of the  knee was observed.        Imaging: X-rays reviewed from previous appointment  Right knee has bone-on-bone contact of the medial compartment with a varus aligned leg.    Kellgren Braulio grade 3 changes       Assessment: Primary osteoarthritis of right knee         Plan:  This point the patient was continue with the anti-inflammatory medications.    We have her a " physical therapy prescription to try a course of therapy she was going to relief her pain.    We will see how the therapy works in his she has reduction in her pain symptoms he has been fantastic but if her pain does not get alleviated with the 6 weeks of physical therapy we will resubmit for viscosupplementation injections          DISCLAIMER: This note may have been dictated using voice recognition software and may contain grammatical errors.     NOTE: Consult report sent to referring provider via [facility] [employer].

## 2024-12-05 ENCOUNTER — PATIENT MESSAGE (OUTPATIENT)
Dept: ORTHOPEDICS | Facility: CLINIC | Age: 44
End: 2024-12-05
Payer: COMMERCIAL

## 2024-12-06 ENCOUNTER — TELEPHONE (OUTPATIENT)
Dept: ORTHOPEDICS | Facility: CLINIC | Age: 44
End: 2024-12-06
Payer: COMMERCIAL

## 2024-12-06 DIAGNOSIS — M17.11 PRIMARY OSTEOARTHRITIS OF RIGHT KNEE: Primary | ICD-10-CM

## 2024-12-06 NOTE — TELEPHONE ENCOUNTER
Patient sent a message through the portal on 12/05/2024. Patient states that she has been able to get into physical therapy since her last visit.    Patient stated that physical therapy is causing more pain than what she already had.    So I spoke with Omar he stated that we can go ahead and do another try for the synvisc one injection..      Patient was informed and she agreed with that.

## 2024-12-09 DIAGNOSIS — F41.1 GENERALIZED ANXIETY DISORDER: ICD-10-CM

## 2024-12-09 RX ORDER — ALPRAZOLAM 1 MG/1
TABLET ORAL
Qty: 60 TABLET | Refills: 2 | Status: SHIPPED | OUTPATIENT
Start: 2024-12-09

## 2024-12-26 ENCOUNTER — PATIENT MESSAGE (OUTPATIENT)
Dept: ORTHOPEDICS | Facility: CLINIC | Age: 44
End: 2024-12-26
Payer: COMMERCIAL

## 2025-01-07 ENCOUNTER — OFFICE VISIT (OUTPATIENT)
Dept: ORTHOPEDICS | Facility: CLINIC | Age: 45
End: 2025-01-07
Payer: COMMERCIAL

## 2025-01-07 DIAGNOSIS — M17.11 PRIMARY OSTEOARTHRITIS OF RIGHT KNEE: Primary | ICD-10-CM

## 2025-01-07 PROCEDURE — 99499 UNLISTED E&M SERVICE: CPT | Mod: ,,, | Performed by: PHYSICIAN ASSISTANT

## 2025-01-07 PROCEDURE — 20610 DRAIN/INJ JOINT/BURSA W/O US: CPT | Mod: RT,,, | Performed by: PHYSICIAN ASSISTANT

## 2025-01-07 RX ADMIN — LIDOCAINE HYDROCHLORIDE 2 ML: 20 INJECTION, SOLUTION INFILTRATION; PERINEURAL at 10:01

## 2025-01-07 NOTE — LETTER
[facility]  [address] [place] [address]  Phone: [phone]  Fax: [phone]      Name: Malia Schroeder  : 1980  Date: 2025    Malia was seen by me on 2025 due to pending treatment. It is my recommendation that she postpone her physical assessment for 6 weeks.       If you should have any questions, please contact my office at [phone].    Thank you,   Omar Headley PA-C

## 2025-01-07 NOTE — PROCEDURES
Large Joint Aspiration/Injection: R knee    Date/Time: 1/7/2025 10:45 AM    Performed by: Omar Headley PA-C  Authorized by: Omar Headley PA-C    Consent Done?:  Yes (Verbal)  Indications:  Pain  Site marked: the procedure site was marked    Timeout: prior to procedure the correct patient, procedure, and site was verified    Prep: patient was prepped and draped in usual sterile fashion      Local anesthesia used?: Yes    Local anesthetic:  Topical anesthetic and lidocaine 2% without epinephrine  Ultrasonic Guidance for needle placement?: No    Approach:  Superior  Location:  Knee  Site:  R knee  Medications:  2 mL LIDOcaine HCL 20 mg/ml (2%) 20 mg/mL (2 %); 48 mg hylan g-f 20 48 mg/6 mL  Patient tolerance:  Patient tolerated the procedure well with no immediate complications    
poor balance

## 2025-01-07 NOTE — PROGRESS NOTES
Patient presents today for Synvisc 1 visco-supplementation injection of the right knee      After verbal consent was obtained, the patient's knee was prepped and sterilely injected with Visco-supplementation.  Band-aid placed.  Patient did well following injection.

## 2025-01-30 DIAGNOSIS — R42 DIZZINESS: Primary | ICD-10-CM

## 2025-01-30 RX ORDER — MECLIZINE HYDROCHLORIDE 25 MG/1
25 TABLET ORAL 3 TIMES DAILY PRN
Qty: 90 TABLET | Refills: 3 | Status: SHIPPED | OUTPATIENT
Start: 2025-01-30

## 2025-02-03 DIAGNOSIS — F41.1 GENERALIZED ANXIETY DISORDER: ICD-10-CM

## 2025-02-03 DIAGNOSIS — I10 HYPERTENSION, UNSPECIFIED TYPE: Primary | ICD-10-CM

## 2025-02-03 RX ORDER — NIFEDIPINE 60 MG/1
TABLET, EXTENDED RELEASE ORAL
Qty: 30 TABLET | Refills: 11 | Status: SHIPPED | OUTPATIENT
Start: 2025-02-03

## 2025-02-03 RX ORDER — ALPRAZOLAM 1 MG/1
TABLET ORAL
Qty: 60 TABLET | Refills: 2 | Status: SHIPPED | OUTPATIENT
Start: 2025-02-03

## 2025-02-03 RX ORDER — LIDOCAINE HYDROCHLORIDE 20 MG/ML
2 INJECTION, SOLUTION INFILTRATION; PERINEURAL
Status: DISCONTINUED | OUTPATIENT
Start: 2025-01-07 | End: 2025-02-03 | Stop reason: HOSPADM

## 2025-02-12 ENCOUNTER — OFFICE VISIT (OUTPATIENT)
Dept: FAMILY MEDICINE | Facility: CLINIC | Age: 45
End: 2025-02-12
Payer: COMMERCIAL

## 2025-02-12 VITALS
WEIGHT: 243 LBS | TEMPERATURE: 98 F | RESPIRATION RATE: 15 BRPM | OXYGEN SATURATION: 96 % | DIASTOLIC BLOOD PRESSURE: 80 MMHG | HEART RATE: 85 BPM | BODY MASS INDEX: 49.08 KG/M2 | SYSTOLIC BLOOD PRESSURE: 124 MMHG

## 2025-02-12 DIAGNOSIS — E66.01 MORBID OBESITY WITH BMI OF 45.0-49.9, ADULT: Primary | ICD-10-CM

## 2025-02-12 DIAGNOSIS — M17.0 PRIMARY OSTEOARTHRITIS OF BOTH KNEES: ICD-10-CM

## 2025-02-12 DIAGNOSIS — F33.9 RECURRENT MAJOR DEPRESSIVE DISORDER, REMISSION STATUS UNSPECIFIED: ICD-10-CM

## 2025-02-12 DIAGNOSIS — J31.0 CHRONIC RHINITIS: ICD-10-CM

## 2025-02-12 DIAGNOSIS — K21.9 GASTROESOPHAGEAL REFLUX DISEASE, UNSPECIFIED WHETHER ESOPHAGITIS PRESENT: ICD-10-CM

## 2025-02-12 DIAGNOSIS — F41.1 GENERALIZED ANXIETY DISORDER: ICD-10-CM

## 2025-02-12 DIAGNOSIS — I10 PRIMARY HYPERTENSION: ICD-10-CM

## 2025-02-12 DIAGNOSIS — F51.01 PRIMARY INSOMNIA: ICD-10-CM

## 2025-02-12 RX ORDER — LEVOCETIRIZINE DIHYDROCHLORIDE 5 MG/1
5 TABLET, FILM COATED ORAL NIGHTLY
Qty: 90 TABLET | Refills: 3 | Status: SHIPPED | OUTPATIENT
Start: 2025-02-12 | End: 2026-02-12

## 2025-02-12 RX ORDER — LANSOPRAZOLE 30 MG/1
30 CAPSULE, DELAYED RELEASE ORAL DAILY
Qty: 90 CAPSULE | Refills: 3 | Status: SHIPPED | OUTPATIENT
Start: 2025-02-12 | End: 2026-02-12

## 2025-02-12 RX ORDER — FAMOTIDINE 20 MG/1
20 TABLET, FILM COATED ORAL NIGHTLY
Qty: 90 TABLET | Refills: 3 | Status: SHIPPED | OUTPATIENT
Start: 2025-02-12 | End: 2026-02-12

## 2025-02-12 NOTE — PROGRESS NOTES
Subjective:       Patient ID: Malia Schroeder is a 44 y.o. female.    Chief Complaint: Follow-up (3 mth f.u)      History of Present Illness    CHIEF COMPLAINT:  Patient presents today for medication management of multiple chronic conditions.    ALLERGIES AND SLEEP:  She reports current allergy medications are not working effectively. She takes Singulair nightly along with Benadryl, which she also uses for sleep after discontinuing Ambien. She experiences severe eye itching, describing intense discomfort. She denies use of Claritin, Zyrtec, or Xyzal.    MENTAL HEALTH:  She takes Xanax and Buspar twice daily with occasional third dose of Buspar as needed for anxiety. For depression, she continues Viibryd 40mg and Abilify with good response.    GERD:  She experiences acid reflux symptoms throughout the day, managed with morning Pantoprazole and Tums for severe symptom exacerbations. She has history of upper endoscopy several years ago.    MUSCULOSKELETAL:  She reports improvement in right knee symptoms following Synvisc injection administered on January 7th.    MEDICATIONS:  She is currently taking semaglutide 20 units (1 mg) weekly for weight management.        Review of Systems  Comprehensive review of systems negative except as stated in HPI    The patient's Health Maintenance was reviewed and the following appears to be due:   There are no preventive care reminders to display for this patient.    Past Medical History:  Past Medical History:   Diagnosis Date    Allergies     Anxiety disorder, unspecified     Chronic sinusitis, unspecified     Depression     GERD (gastroesophageal reflux disease)     Hyperlipidemia     Hypertension     Hypokalemia     Iron deficiency anemia, unspecified     Mild intermittent asthma, uncomplicated     Mild intermittent asthma, uncomplicated     Obstructive sleep apnea     Peripheral edema      Past Surgical History:   Procedure Laterality Date    CHOLECYSTECTOMY      HYSTERECTOMY       PLANTAR FASCIA RELEASE      SINUS SURGERY      TARSAL TUNNEL RELEASE      TONSILLECTOMY       Review of patient's allergies indicates:   Allergen Reactions    Hydrocodone-acetaminophen Hives    Promethazine Anxiety     Current Outpatient Medications on File Prior to Visit   Medication Sig Dispense Refill    ALPRAZolam (XANAX) 1 MG tablet TAKE ONE (1) TABLET BY MOUTH TWICE A DAY FOR ANXIETY 60 tablet 2    ARIPiprazole (ABILIFY) 5 MG Tab Take 1 tablet (5 mg total) by mouth once daily. 30 tablet 11    diclofenac (VOLTAREN) 75 MG EC tablet Take 1 tablet (75 mg total) by mouth 2 (two) times daily. Take with food 60 tablet 1    meclizine (ANTIVERT) 25 mg tablet Take 1 tablet (25 mg total) by mouth 3 (three) times daily as needed for Dizziness. 90 tablet 3    montelukast (SINGULAIR) 10 mg tablet TAKE ONE (1) TABLET (10 MG TOTAL) BY MOUTH EVERY EVENING. 30 tablet 11    NIFEdipine (PROCARDIA-XL) 60 MG (OSM) 24 hr tablet TAKE ONE (1) TABLET (60 MG TOTAL) BY MOUTH ONCE DAILY FOR BLOOD PRESSURE 30 tablet 11    potassium chloride (MICRO-K) 10 MEQ CpSR TAKE TWO (2) CAPSULES (20 MEQ TOTAL) BY MOUTH ONCE DAILY. 60 capsule 11    pravastatin (PRAVACHOL) 40 MG tablet TAKE ONE (1) TABLET (40 MG TOTAL) BY MOUTH ONCE DAILY. 30 tablet 11    semaglutide, weight loss, (WEGOVY) 0.5 mg/0.5 mL PnIj Inject 0.5 mg into the skin every 7 days. 2 mL 1    spironolactone (ALDACTONE) 50 MG tablet TAKE ONE (1) TABLET BY MOUTH ONCE DAILY. 30 tablet 11    vilazodone (VIIBRYD) 40 mg Tab tablet TAKE ONE (1) TABLET (40 MG TOTAL) BY MOUTH ONCE DAILY FOR DEPRESSION/MOOD 30 tablet 11    [DISCONTINUED] pantoprazole (PROTONIX) 40 MG tablet TAKE ONE (1) TABLET BY MOUTH TWICE DAILY FOR ACID REFLUX. 60 tablet 5    busPIRone (BUSPAR) 30 MG Tab Take 1 tablet (30 mg total) by mouth 3 (three) times daily. 90 tablet 11     No current facility-administered medications on file prior to visit.     Social History     Socioeconomic History    Marital status: Single    Tobacco Use    Smoking status: Some Days     Current packs/day: 0.00     Types: Cigarettes    Smokeless tobacco: Never   Substance and Sexual Activity    Alcohol use: Not Currently     Comment: socially    Drug use: Never    Sexual activity: Not Currently     Social Drivers of Health     Financial Resource Strain: Low Risk  (11/27/2023)    Overall Financial Resource Strain (CARDIA)     Difficulty of Paying Living Expenses: Not hard at all   Food Insecurity: No Food Insecurity (11/27/2023)    Hunger Vital Sign     Worried About Running Out of Food in the Last Year: Never true     Ran Out of Food in the Last Year: Never true   Transportation Needs: No Transportation Needs (11/27/2023)    PRAPARE - Transportation     Lack of Transportation (Medical): No     Lack of Transportation (Non-Medical): No   Physical Activity: Inactive (11/27/2023)    Exercise Vital Sign     Days of Exercise per Week: 0 days     Minutes of Exercise per Session: 0 min   Stress: Stress Concern Present (11/27/2023)    [employer] [place] [employer] - Occupational Stress Questionnaire     Feeling of Stress : To some extent   Housing Stability: Low Risk  (11/27/2023)    Housing Stability Vital Sign     Unable to Pay for Housing in the Last Year: No     Number of Places Lived in the Last Year: 1     Unstable Housing in the Last Year: No     Family History   Problem Relation Name Age of Onset    Hypertension Mother Keyla Schroeder     Dementia Mother Keyla Schroeder     Arthritis Mother Keyla Schroeder     Cancer Father Sami Schroeder         Non hodgekins lymphoma       Objective:       /80   Pulse 85   Temp 98.1 °F (36.7 °C) (Oral)   Resp 15   Wt 110.2 kg (243 lb)   SpO2 96%   BMI 49.08 kg/m²      Physical Exam  Vitals and nursing note reviewed.   Constitutional:       Appearance: Normal appearance. She is obese.   HENT:      Head: Normocephalic and atraumatic.      Right Ear: Tympanic membrane, ear canal and external ear normal.       Left Ear: Tympanic membrane, ear canal and external ear normal.      Nose: Nose normal.      Mouth/Throat:      Mouth: Mucous membranes are moist.      Pharynx: Oropharynx is clear.   Eyes:      Extraocular Movements: Extraocular movements intact.      Conjunctiva/sclera: Conjunctivae normal.      Pupils: Pupils are equal, round, and reactive to light.   Cardiovascular:      Rate and Rhythm: Normal rate and regular rhythm.      Heart sounds: Normal heart sounds.   Pulmonary:      Effort: Pulmonary effort is normal.      Breath sounds: Normal breath sounds.   Musculoskeletal:         General: Normal range of motion.      Cervical back: Normal range of motion and neck supple.   Skin:     General: Skin is warm and dry.   Neurological:      General: No focal deficit present.      Mental Status: She is alert and oriented to person, place, and time.   Psychiatric:         Mood and Affect: Mood normal.         Behavior: Behavior normal.         Thought Content: Thought content normal.         Judgment: Judgment normal.             Assessment and Plan     Assessment & Plan    E66.01, Z68.42 Morbid obesity with BMI of 45.0-49.9, adult  F41.1 Generalized anxiety disorder  F33.9 Recurrent major depressive disorder, remission status unspecified  I10 Primary hypertension  F51.01 Primary insomnia  K21.9 Gastroesophageal reflux disease, unspecified whether esophagitis present  J31.0 Chronic rhinitis  E66.01 Morbid obesity due to excess calories    IMPRESSION:  - Assessed current medication regimen for allergies and acid reflux, determining need for adjustments due to reported ineffectiveness  - Evaluated weight loss progress on semaglutide, noting successful reduction from 260 to 243 lbs  - Considered potential need for upper endoscopy if GERD symptoms persist  - Reviewed anxiety and depression management, finding current medication combination effective  - Noted improvement in knee pain following gel injection, anticipating  further benefit with continued weight loss    E66.01, Z68.42 MORBID OBESITY WITH BMI OF 45.0-49.9, ADULT:  - Continue semaglutide injections at 20 units (1 mg) for weight management.  - Noted significant weight loss from 260-259 lbs to 243 lbs.  - Acknowledged weight loss progress and discussed potential for further improvement.  - Advised to maintain current dose of semaglutide with option to increase if weight loss plateaus.  - Instructed the patient to contact the office if they want to change semaglutide dose before next appointment.  - Scheduled follow-up visit in 3 months.    F41.1 GENERALIZED ANXIETY DISORDER:  - Continue Xanax: Take twice daily.  - Continue Buspar: Take twice daily, up to 3 times if needed.    F33.9 RECURRENT MAJOR DEPRESSIVE DISORDER, REMISSION STATUS UNSPECIFIED:  - Continue Viibryd: 40 mg daily.  - Continue Abilify as prescribed.    I10 PRIMARY HYPERTENSION:  - Continue nifedipine and spironolactone as prescribed.    K21.9 GASTROESOPHAGEAL REFLUX DISEASE, UNSPECIFIED WHETHER ESOPHAGITIS PRESENT:  - Initiate Prevacid: Take daily in the morning.  - Initiate Pepcid: Take 20 mg at night.  - Discontinue Pantoprazole.    J31.0 CHRONIC RHINITIS:  - Educated the patient that Singulair works differently from other allergy medications, allowing concurrent use with antihistamines.  - Initiate Xyzal: Take with Singulair at night.           1. Morbid obesity with BMI of 45.0-49.9, adult  Overview:  11/11/2024 - 259 lb, BMI 52.31, start compound semaglutide from [facility] pharmacy  02/12/2025 - 243 lb, BMI 49.08      2. Generalized anxiety disorder  Overview:  Xanax 1 mg BID prn     Effexor - did not work, made her feel worse  Zoloft > 10 years, DC on 10/15/2021  10/15/2021 - DC Zoloft 200 mg daily, start Viibryd 20 mg daily   02/03/2022 - Viibryd 40 mg daily   03/09/2023 - add BuSpar 15 mg twice daily  11/28/2023 - BuSpar 30 mg twice daily  02/07/2024 - BuSpar 30 mg 3 times daily      3.  Recurrent major depressive disorder, remission status unspecified  Overview:  Effexor - did not work, made her feel worse  Zoloft > 10 years, DC on 10/15/2021  10/15/2021 - DC Zoloft 200 mg daily, start Viibryd 20 mg daily   02/03/2022 - Viibryd 40 mg daily   06/06/2024 - add Abilify 2 mg daily  06/24/2024 - Abilify 5 mg daily      4. Primary hypertension  Overview:  DX by previous provider, initially on benazepril 10 mg daily   09/17/2021 - benazepril 10 mg BID  11/17/2021 - add nifedipine 30 mg daily  11/22/2021 - increase nifedipine to 60 mg daily   12/02/2021 - spironolactone 50 mg daily added per Dr Saldivar  09/25/2024 - metoprolol 50 mg daily added per Cardiology  10/24/2024 - discontinue benazepril due to hypotension  11/06/2024 - discontinue metoprolol due to peripheral edema        5. Primary insomnia  Overview:  05/16/2024 - trial trazodone 100 mg at bedtime   05/29/2024 - discontinue trazodone, trial Ambien 5 mg at bedtime (Dc'd after 1 month)      6. Gastroesophageal reflux disease, unspecified whether esophagitis present  Overview:  02/12/2025 - discontinue pantoprazole, start lansoprazole 30 mg in the morning and famotidine 20 mg at bedtime    Orders:  -     lansoprazole (PREVACID) 30 MG capsule; Take 1 capsule (30 mg total) by mouth once daily.  Dispense: 90 capsule; Refill: 3  -     famotidine (PEPCID) 20 MG tablet; Take 1 tablet (20 mg total) by mouth every evening.  Dispense: 90 tablet; Refill: 3    7. Chronic rhinitis  Overview:  Singulair 10 mg daily at bedtime    02/12/2025 - add Xyzal 5 mg at bedtime    Orders:  -     levocetirizine (XYZAL) 5 MG tablet; Take 1 tablet (5 mg total) by mouth every evening.  Dispense: 90 tablet; Refill: 3    8. Primary osteoarthritis of both knees  Overview:  [facility]    Assessment & Plan:  Significant improvement since Synvisc injection of the right knee completed in January             Follow up in 16 weeks (on 6/4/2025) for Annual.     This note was  generated with the assistance of ambient listening technology. Verbal consent was obtained by the patient and accompanying visitor(s) for the recording of patient appointment to facilitate this note. I attest to having reviewed and edited the generated note for accuracy, though some syntax or spelling errors may persist. Please contact the author of this note for any clarification.

## 2025-02-17 ENCOUNTER — TELEPHONE (OUTPATIENT)
Dept: ORTHOPEDICS | Facility: CLINIC | Age: 45
End: 2025-02-17
Payer: COMMERCIAL

## 2025-02-17 ENCOUNTER — PATIENT MESSAGE (OUTPATIENT)
Dept: ORTHOPEDICS | Facility: CLINIC | Age: 45
End: 2025-02-17
Payer: COMMERCIAL

## 2025-02-17 NOTE — TELEPHONE ENCOUNTER
Patient called requesting a letter to return to regular duty     Attempted to call patient to get a date. No answer. Left a voicemail.

## 2025-03-03 ENCOUNTER — OFFICE VISIT (OUTPATIENT)
Dept: FAMILY MEDICINE | Facility: CLINIC | Age: 45
End: 2025-03-03
Payer: COMMERCIAL

## 2025-03-03 VITALS
BODY MASS INDEX: 48.59 KG/M2 | OXYGEN SATURATION: 98 % | WEIGHT: 241 LBS | SYSTOLIC BLOOD PRESSURE: 137 MMHG | DIASTOLIC BLOOD PRESSURE: 84 MMHG | HEART RATE: 75 BPM | TEMPERATURE: 98 F | HEIGHT: 59 IN | RESPIRATION RATE: 16 BRPM

## 2025-03-03 DIAGNOSIS — F41.1 GENERALIZED ANXIETY DISORDER: ICD-10-CM

## 2025-03-03 DIAGNOSIS — U07.1 COVID: Primary | ICD-10-CM

## 2025-03-03 LAB
CTP QC/QA: YES
SARS CORONAVIRUS 2 ANTIGEN: POSITIVE

## 2025-03-03 PROCEDURE — 99214 OFFICE O/P EST MOD 30 MIN: CPT | Mod: ,,, | Performed by: NURSE PRACTITIONER

## 2025-03-03 PROCEDURE — 3008F BODY MASS INDEX DOCD: CPT | Mod: CPTII,,, | Performed by: NURSE PRACTITIONER

## 2025-03-03 PROCEDURE — 1160F RVW MEDS BY RX/DR IN RCRD: CPT | Mod: CPTII,,, | Performed by: NURSE PRACTITIONER

## 2025-03-03 PROCEDURE — 1159F MED LIST DOCD IN RCRD: CPT | Mod: CPTII,,, | Performed by: NURSE PRACTITIONER

## 2025-03-03 PROCEDURE — 3079F DIAST BP 80-89 MM HG: CPT | Mod: CPTII,,, | Performed by: NURSE PRACTITIONER

## 2025-03-03 PROCEDURE — 3075F SYST BP GE 130 - 139MM HG: CPT | Mod: CPTII,,, | Performed by: NURSE PRACTITIONER

## 2025-03-03 PROCEDURE — 87811 SARS-COV-2 COVID19 W/OPTIC: CPT | Mod: QW,,, | Performed by: NURSE PRACTITIONER

## 2025-03-03 RX ORDER — CHLOPHEDIANOL HCL AND PYRILAMINE MALEATE 12.5; 12.5 MG/5ML; MG/5ML
10 SOLUTION ORAL EVERY 8 HOURS PRN
Qty: 200 ML | Refills: 0 | Status: SHIPPED | OUTPATIENT
Start: 2025-03-03

## 2025-03-03 RX ORDER — BUSPIRONE HYDROCHLORIDE 30 MG/1
TABLET ORAL
Qty: 90 TABLET | Refills: 11 | Status: SHIPPED | OUTPATIENT
Start: 2025-03-03

## 2025-03-03 NOTE — PROGRESS NOTES
Subjective:       Patient ID: Malia Schroeder is a 44 y.o. female.    Chief Complaint: Cough (X3 days) and Nasal Congestion (X3 days)      History of Present Illness    CHIEF COMPLAINT:  Patient presents today with symptoms of illness that started Friday.    FLU-LIKE SYMPTOMS:  She reports fever with chills and teeth chattering on Friday and Saturday nights. Initial dry cough has progressed to productive. She experiences nasal congestion that worsens at night and improves during the day. She notes ears popping but denies ear pain.    MEDICATIONS:  She  has albuterol at home. She recently completed Ninja cough medication.    SOCIAL HISTORY:  She reports smoking only when drinking, which is a significant reduction from previous heavy smoking habits. She has lost 20 lbs since December.        Review of Systems   Constitutional:  Positive for chills. Negative for fever.   HENT:  Positive for postnasal drip and sore throat. Negative for ear pain and rhinorrhea.    Respiratory:  Positive for cough and shortness of breath. Negative for wheezing.    Cardiovascular:  Negative for chest pain.   Musculoskeletal:  Negative for myalgias.   Integumentary:  Negative for rash.   Allergic/Immunologic: Positive for environmental allergies.   Neurological:  Positive for headaches.     Comprehensive review of systems negative except as stated in HPI    The patient's Health Maintenance was reviewed and the following appears to be due:   There are no preventive care reminders to display for this patient.    Past Medical History:  Past Medical History:   Diagnosis Date    Allergies     Anxiety disorder, unspecified     Chronic sinusitis, unspecified     Depression     GERD (gastroesophageal reflux disease)     Hyperlipidemia     Hypertension     Hypokalemia     Iron deficiency anemia, unspecified     Mild intermittent asthma, uncomplicated     Mild intermittent asthma, uncomplicated     Obstructive sleep apnea     Peripheral edema   "    Past Surgical History:   Procedure Laterality Date    CHOLECYSTECTOMY      HYSTERECTOMY      PLANTAR FASCIA RELEASE      SINUS SURGERY      TARSAL TUNNEL RELEASE      TONSILLECTOMY       Review of patient's allergies indicates:   Allergen Reactions    Hydrocodone-acetaminophen Hives    Promethazine Anxiety     Medications Ordered Prior to Encounter[1]  Social History[2]  Family History   Problem Relation Name Age of Onset    Hypertension Mother Keyla Schroeder     Dementia Mother Keyla Schroeder     Arthritis Mother Keyla Schroeder     Cancer Father Sami Schroeder         Non hodgekins lymphoma       Objective:       /84 (BP Location: Left arm)   Pulse 75   Temp 97.8 °F (36.6 °C) (Oral)   Resp 16   Ht 4' 11" (1.499 m)   Wt 109.3 kg (241 lb)   SpO2 98%   BMI 48.68 kg/m²      Physical Exam  Vitals and nursing note reviewed.   Constitutional:       Appearance: Normal appearance. She is obese.   HENT:      Head: Normocephalic and atraumatic.      Right Ear: Tympanic membrane, ear canal and external ear normal.      Left Ear: Tympanic membrane, ear canal and external ear normal.      Nose: Congestion present.      Mouth/Throat:      Mouth: Mucous membranes are moist.      Pharynx: Oropharynx is clear.   Eyes:      Extraocular Movements: Extraocular movements intact.      Conjunctiva/sclera: Conjunctivae normal.      Pupils: Pupils are equal, round, and reactive to light.   Cardiovascular:      Rate and Rhythm: Normal rate and regular rhythm.      Heart sounds: Normal heart sounds.   Pulmonary:      Effort: Pulmonary effort is normal.      Breath sounds: Normal breath sounds.   Musculoskeletal:         General: Normal range of motion.      Cervical back: Normal range of motion and neck supple.   Skin:     General: Skin is warm and dry.   Neurological:      General: No focal deficit present.      Mental Status: She is alert and oriented to person, place, and time.   Psychiatric:         Mood and Affect: Mood normal.  "        Behavior: Behavior normal.         Thought Content: Thought content normal.         Judgment: Judgment normal.         Labs  Office Visit on 03/03/2025   Component Date Value Ref Range Status    SARS Coronavirus 2 Antigen 03/03/2025 Positive (A)  Negative, Presumptive Negative Final     Acceptable 03/03/2025 Yes   Final         Assessment and Plan     Assessment & Plan    U07.1 COVID    IMPRESSION:  - COVID-19 test positive with symptoms starting on Friday  - Patient within first 5 days of symptom onset, eligible for antiviral treatment  - Higher risk patient due to restrictive lung disease and obesity  - Opted to prescribe Paxlovid instead of steroid injection to avoid potential harm           1. COVID  -     pyrilamine-chlophedianoL (NINJACOF) 12.5-12.5 mg/5 mL Liqd; Take 10 mLs by mouth every 8 (eight) hours as needed (cough/congestion).  Dispense: 200 mL; Refill: 0  -     nirmatrelvir-ritonavir 300 mg (150 mg x 2)-100 mg copackaged tablets (EUA); Take 3 tablets by mouth 2 (two) times daily for 5 days. Each dose contains 2 nirmatrelvir (pink tablets) and 1 ritonavir (white tablet). Take all 3 tablets together  Dispense: 30 tablet; Refill: 0  -     SARS Coronavirus 2 Antigen, POCT Manual Read           Follow up if symptoms worsen or fail to improve.     This note was generated with the assistance of ambient listening technology. Verbal consent was obtained by the patient and accompanying visitor(s) for the recording of patient appointment to facilitate this note. I attest to having reviewed and edited the generated note for accuracy, though some syntax or spelling errors may persist. Please contact the author of this note for any clarification.       Answers submitted by the patient for this visit:  Cough Questionnaire (Submitted on 3/2/2025)  Chief Complaint: Cough  Chronicity: new  Onset: in the past 7 days  Progression since onset: unchanged  Frequency: every few minutes  Cough  characteristics: non-productive  ear congestion: Yes  heartburn: No  hemoptysis: No  nasal congestion: Yes  sweats: No  weight loss: No  Aggravated by: nothing  asthma: Yes  bronchiectasis: No  bronchitis: Yes  COPD: No  emphysema: No  pneumonia: No  Treatments tried: OTC cough suppressant, a beta-agonist inhaler, prescription cough suppressant  Improvement on treatment: no relief         [1]   Current Outpatient Medications on File Prior to Visit   Medication Sig Dispense Refill    ALPRAZolam (XANAX) 1 MG tablet TAKE ONE (1) TABLET BY MOUTH TWICE A DAY FOR ANXIETY 60 tablet 2    ARIPiprazole (ABILIFY) 5 MG Tab Take 1 tablet (5 mg total) by mouth once daily. 30 tablet 11    diclofenac (VOLTAREN) 75 MG EC tablet Take 1 tablet (75 mg total) by mouth 2 (two) times daily. Take with food 60 tablet 1    famotidine (PEPCID) 20 MG tablet Take 1 tablet (20 mg total) by mouth every evening. 90 tablet 3    lansoprazole (PREVACID) 30 MG capsule Take 1 capsule (30 mg total) by mouth once daily. 90 capsule 3    levocetirizine (XYZAL) 5 MG tablet Take 1 tablet (5 mg total) by mouth every evening. 90 tablet 3    meclizine (ANTIVERT) 25 mg tablet Take 1 tablet (25 mg total) by mouth 3 (three) times daily as needed for Dizziness. 90 tablet 3    montelukast (SINGULAIR) 10 mg tablet TAKE ONE (1) TABLET (10 MG TOTAL) BY MOUTH EVERY EVENING. 30 tablet 11    NIFEdipine (PROCARDIA-XL) 60 MG (OSM) 24 hr tablet TAKE ONE (1) TABLET (60 MG TOTAL) BY MOUTH ONCE DAILY FOR BLOOD PRESSURE 30 tablet 11    potassium chloride (MICRO-K) 10 MEQ CpSR TAKE TWO (2) CAPSULES (20 MEQ TOTAL) BY MOUTH ONCE DAILY. 60 capsule 11    pravastatin (PRAVACHOL) 40 MG tablet TAKE ONE (1) TABLET (40 MG TOTAL) BY MOUTH ONCE DAILY. 30 tablet 11    semaglutide, weight loss, (WEGOVY) 0.5 mg/0.5 mL PnIj Inject 0.5 mg into the skin every 7 days. 2 mL 1    spironolactone (ALDACTONE) 50 MG tablet TAKE ONE (1) TABLET BY MOUTH ONCE DAILY. 30 tablet 11    vilazodone (VIIBRYD) 40  mg Tab tablet TAKE ONE (1) TABLET (40 MG TOTAL) BY MOUTH ONCE DAILY FOR DEPRESSION/MOOD 30 tablet 11    [DISCONTINUED] busPIRone (BUSPAR) 30 MG Tab Take 1 tablet (30 mg total) by mouth 3 (three) times daily. 90 tablet 11     No current facility-administered medications on file prior to visit.   [2]   Social History  Socioeconomic History    Marital status: Single   Tobacco Use    Smoking status: Some Days     Current packs/day: 0.00     Types: Cigarettes    Smokeless tobacco: Never   Substance and Sexual Activity    Alcohol use: Not Currently     Comment: socially    Drug use: Never    Sexual activity: Not Currently     Social Drivers of Health     Financial Resource Strain: Low Risk  (3/2/2025)    Overall Financial Resource Strain (CARDIA)     Difficulty of Paying Living Expenses: Not very hard   Food Insecurity: No Food Insecurity (3/2/2025)    Hunger Vital Sign     Worried About Running Out of Food in the Last Year: Never true     Ran Out of Food in the Last Year: Never true   Transportation Needs: No Transportation Needs (3/2/2025)    PRAPARE - Transportation     Lack of Transportation (Medical): No     Lack of Transportation (Non-Medical): No   Physical Activity: Sufficiently Active (3/2/2025)    Exercise Vital Sign     Days of Exercise per Week: 5 days     Minutes of Exercise per Session: 70 min   Stress: No Stress Concern Present (3/2/2025)    [employer] - Occupational Stress Questionnaire     Feeling of Stress : Not at all   Housing Stability: Low Risk  (3/2/2025)    Housing Stability Vital Sign     Unable to Pay for Housing in the Last Year: No     Number of Times Moved in the Last Year: 0     Homeless in the Last Year: No

## 2025-03-03 NOTE — LETTER
March 3, 2025    Malia Schroeder  [address] [place] [address]             [facility]  Family Medicine  [address] [place] [address]  Phone: [phone]   March 3, 2025     Patient: Malia Schroeder   YOB: 1980   Date of Visit: 3/3/2025       To Whom it May Concern:    Malia Schroeder was seen in my clinic on 3/3/2025. She may return to work on 3/5/25 .    Please excuse her from any classes or work missed.    If you have any questions or concerns, please don't hesitate to call.    Sincerely,         MANAN Nicholas

## 2025-05-09 ENCOUNTER — HOSPITAL ENCOUNTER (OUTPATIENT)
Dept: RADIOLOGY | Facility: HOSPITAL | Age: 45
Discharge: HOME | End: 2025-05-09
Attending: NURSE PRACTITIONER
Payer: COMMERCIAL

## 2025-05-09 ENCOUNTER — TELEPHONE (OUTPATIENT)
Dept: FAMILY MEDICINE | Facility: CLINIC | Age: 45
End: 2025-05-09
Payer: COMMERCIAL
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
## 2025-05-09 PROCEDURE — 71046 X-RAY EXAM CHEST 2 VIEWS: CPT | Mod: TC

## 2025-05-09 PROCEDURE — 71100 X-RAY EXAM RIBS UNI 2 VIEWS: CPT | Mod: TC,RT

## 2025-05-09 NOTE — TELEPHONE ENCOUNTER
Message from NORIS Tomlin.  Patient had a fall.  XR R Rib and XR PA Chest ordered for patient.  Patient notified of orders

## 2025-05-12 ENCOUNTER — RESULTS FOLLOW-UP (OUTPATIENT)
Dept: FAMILY MEDICINE | Facility: CLINIC | Age: 45
End: 2025-05-12

## 2025-05-23 ENCOUNTER — CLINICAL SUPPORT (OUTPATIENT)
Dept: FAMILY MEDICINE | Facility: CLINIC | Age: 45
End: 2025-05-23
Payer: COMMERCIAL

## 2025-05-23 DIAGNOSIS — Z00.00 ANNUAL PHYSICAL EXAM: Primary | ICD-10-CM

## 2025-05-23 LAB
ALBUMIN SERPL-MCNC: 3.8 G/DL (ref 3.5–5)
ALBUMIN/GLOB SERPL: 1 RATIO (ref 1.1–2)
ALP SERPL-CCNC: 148 UNIT/L (ref 40–150)
ALT SERPL-CCNC: 22 UNIT/L (ref 0–55)
ANION GAP SERPL CALC-SCNC: 7 MEQ/L
AST SERPL-CCNC: 17 UNIT/L (ref 11–45)
BASOPHILS # BLD AUTO: 0.03 X10(3)/MCL
BASOPHILS NFR BLD AUTO: 0.3 %
BILIRUB SERPL-MCNC: 0.6 MG/DL
BUN SERPL-MCNC: 16.1 MG/DL (ref 7–18.7)
CALCIUM SERPL-MCNC: 9.6 MG/DL (ref 8.4–10.2)
CHLORIDE SERPL-SCNC: 102 MMOL/L (ref 98–107)
CHOLEST SERPL-MCNC: 119 MG/DL
CHOLEST/HDLC SERPL: 4 {RATIO} (ref 0–5)
CO2 SERPL-SCNC: 28 MMOL/L (ref 22–29)
CREAT SERPL-MCNC: 0.79 MG/DL (ref 0.55–1.02)
CREAT/UREA NIT SERPL: 20
EOSINOPHIL # BLD AUTO: 0.2 X10(3)/MCL (ref 0–0.9)
EOSINOPHIL NFR BLD AUTO: 2.2 %
ERYTHROCYTE [DISTWIDTH] IN BLOOD BY AUTOMATED COUNT: 12.9 % (ref 11.5–17)
EST. AVERAGE GLUCOSE BLD GHB EST-MCNC: 88.2 MG/DL
GFR SERPLBLD CREATININE-BSD FMLA CKD-EPI: >60 ML/MIN/1.73/M2
GLOBULIN SER-MCNC: 3.9 GM/DL (ref 2.4–3.5)
GLUCOSE SERPL-MCNC: 91 MG/DL (ref 74–100)
HBA1C MFR BLD: 4.7 %
HCT VFR BLD AUTO: 40 % (ref 37–47)
HDLC SERPL-MCNC: 29 MG/DL (ref 35–60)
HGB BLD-MCNC: 13.5 G/DL (ref 12–16)
IMM GRANULOCYTES # BLD AUTO: 0.03 X10(3)/MCL (ref 0–0.04)
IMM GRANULOCYTES NFR BLD AUTO: 0.3 %
LDLC SERPL CALC-MCNC: 57 MG/DL (ref 50–140)
LYMPHOCYTES # BLD AUTO: 2.41 X10(3)/MCL (ref 0.6–4.6)
LYMPHOCYTES NFR BLD AUTO: 26.5 %
MCH RBC QN AUTO: 30.5 PG (ref 27–31)
MCHC RBC AUTO-ENTMCNC: 33.8 G/DL (ref 33–36)
MCV RBC AUTO: 90.5 FL (ref 80–94)
MONOCYTES # BLD AUTO: 0.47 X10(3)/MCL (ref 0.1–1.3)
MONOCYTES NFR BLD AUTO: 5.2 %
NEUTROPHILS # BLD AUTO: 5.96 X10(3)/MCL (ref 2.1–9.2)
NEUTROPHILS NFR BLD AUTO: 65.5 %
PLATELET # BLD AUTO: 354 X10(3)/MCL (ref 130–400)
PMV BLD AUTO: 9.1 FL (ref 7.4–10.4)
POTASSIUM SERPL-SCNC: 4.4 MMOL/L (ref 3.5–5.1)
PROT SERPL-MCNC: 7.7 GM/DL (ref 6.4–8.3)
RBC # BLD AUTO: 4.42 X10(6)/MCL (ref 4.2–5.4)
SODIUM SERPL-SCNC: 137 MMOL/L (ref 136–145)
TRIGL SERPL-MCNC: 164 MG/DL (ref 37–140)
TSH SERPL-ACNC: 1.02 UIU/ML (ref 0.35–4.94)
VLDLC SERPL CALC-MCNC: 33 MG/DL
WBC # BLD AUTO: 9.1 X10(3)/MCL (ref 4.5–11.5)

## 2025-05-23 PROCEDURE — 84443 ASSAY THYROID STIM HORMONE: CPT | Performed by: NURSE PRACTITIONER

## 2025-05-23 PROCEDURE — 80053 COMPREHEN METABOLIC PANEL: CPT | Performed by: NURSE PRACTITIONER

## 2025-05-23 PROCEDURE — 80061 LIPID PANEL: CPT | Performed by: NURSE PRACTITIONER

## 2025-05-23 PROCEDURE — 83036 HEMOGLOBIN GLYCOSYLATED A1C: CPT | Performed by: NURSE PRACTITIONER

## 2025-05-23 PROCEDURE — 85025 COMPLETE CBC W/AUTO DIFF WBC: CPT | Performed by: NURSE PRACTITIONER

## 2025-05-23 NOTE — PROGRESS NOTES
Patient presents for lab draw. R AC, tolerated well. Labs sent to [facility]    1G,2P

## 2025-05-27 ENCOUNTER — RESULTS FOLLOW-UP (OUTPATIENT)
Dept: FAMILY MEDICINE | Facility: CLINIC | Age: 45
End: 2025-05-27

## 2025-05-28 ENCOUNTER — TELEPHONE (OUTPATIENT)
Dept: FAMILY MEDICINE | Facility: CLINIC | Age: 45
End: 2025-05-28
Payer: COMMERCIAL

## 2025-06-02 DIAGNOSIS — F41.1 GENERALIZED ANXIETY DISORDER: ICD-10-CM

## 2025-06-02 DIAGNOSIS — F33.9 RECURRENT MAJOR DEPRESSIVE DISORDER, REMISSION STATUS UNSPECIFIED: ICD-10-CM

## 2025-06-02 RX ORDER — ARIPIPRAZOLE 5 MG/1
TABLET ORAL
Qty: 30 TABLET | Refills: 2 | Status: SHIPPED | OUTPATIENT
Start: 2025-06-02

## 2025-06-02 RX ORDER — ALPRAZOLAM 1 MG/1
TABLET ORAL
Qty: 60 TABLET | Refills: 2 | Status: SHIPPED | OUTPATIENT
Start: 2025-06-02

## 2025-06-06 ENCOUNTER — OFFICE VISIT (OUTPATIENT)
Dept: FAMILY MEDICINE | Facility: CLINIC | Age: 45
End: 2025-06-06
Payer: COMMERCIAL

## 2025-06-06 VITALS
WEIGHT: 226 LBS | DIASTOLIC BLOOD PRESSURE: 78 MMHG | SYSTOLIC BLOOD PRESSURE: 119 MMHG | RESPIRATION RATE: 16 BRPM | OXYGEN SATURATION: 97 % | HEART RATE: 82 BPM | BODY MASS INDEX: 45.56 KG/M2 | HEIGHT: 59 IN

## 2025-06-06 DIAGNOSIS — F41.1 GENERALIZED ANXIETY DISORDER: ICD-10-CM

## 2025-06-06 DIAGNOSIS — Z00.00 ANNUAL PHYSICAL EXAM: Primary | ICD-10-CM

## 2025-06-06 DIAGNOSIS — E66.01 MORBID OBESITY WITH BMI OF 45.0-49.9, ADULT: ICD-10-CM

## 2025-06-06 DIAGNOSIS — F33.9 RECURRENT MAJOR DEPRESSIVE DISORDER, REMISSION STATUS UNSPECIFIED: ICD-10-CM

## 2025-06-06 DIAGNOSIS — E78.2 MIXED HYPERLIPIDEMIA: ICD-10-CM

## 2025-06-06 DIAGNOSIS — I10 PRIMARY HYPERTENSION: ICD-10-CM

## 2025-06-06 DIAGNOSIS — Z12.31 BREAST CANCER SCREENING BY MAMMOGRAM: ICD-10-CM

## 2025-06-06 DIAGNOSIS — G47.33 OBSTRUCTIVE SLEEP APNEA SYNDROME: ICD-10-CM

## 2025-06-30 DIAGNOSIS — K21.9 GASTROESOPHAGEAL REFLUX DISEASE, UNSPECIFIED WHETHER ESOPHAGITIS PRESENT: ICD-10-CM

## 2025-07-01 RX ORDER — PANTOPRAZOLE SODIUM 40 MG/1
TABLET, DELAYED RELEASE ORAL
Qty: 60 TABLET | Refills: 5 | Status: SHIPPED | OUTPATIENT
Start: 2025-07-01

## 2025-07-31 ENCOUNTER — OFFICE VISIT (OUTPATIENT)
Dept: FAMILY MEDICINE | Facility: CLINIC | Age: 45
End: 2025-07-31
Payer: COMMERCIAL

## 2025-07-31 ENCOUNTER — PATIENT MESSAGE (OUTPATIENT)
Facility: CLINIC | Age: 45
End: 2025-07-31
Payer: COMMERCIAL

## 2025-07-31 VITALS
RESPIRATION RATE: 16 BRPM | WEIGHT: 217 LBS | HEIGHT: 59 IN | DIASTOLIC BLOOD PRESSURE: 75 MMHG | HEART RATE: 76 BPM | TEMPERATURE: 98 F | BODY MASS INDEX: 43.75 KG/M2 | OXYGEN SATURATION: 97 % | SYSTOLIC BLOOD PRESSURE: 108 MMHG

## 2025-07-31 DIAGNOSIS — F41.1 GENERALIZED ANXIETY DISORDER: ICD-10-CM

## 2025-07-31 DIAGNOSIS — F33.9 RECURRENT MAJOR DEPRESSIVE DISORDER, REMISSION STATUS UNSPECIFIED: ICD-10-CM

## 2025-07-31 DIAGNOSIS — J01.00 ACUTE NON-RECURRENT MAXILLARY SINUSITIS: Primary | ICD-10-CM

## 2025-07-31 LAB
CTP QC/QA: YES
SARS-COV+SARS-COV-2 AG RESP QL IA.RAPID: NEGATIVE

## 2025-07-31 RX ORDER — CEFDINIR 300 MG/1
300 CAPSULE ORAL 2 TIMES DAILY
Qty: 20 CAPSULE | Refills: 0 | Status: SHIPPED | OUTPATIENT
Start: 2025-07-31 | End: 2025-08-10

## 2025-07-31 RX ORDER — DEXAMETHASONE SODIUM PHOSPHATE 10 MG/ML
10 INJECTION INTRAMUSCULAR; INTRAVENOUS
Status: COMPLETED | OUTPATIENT
Start: 2025-07-31 | End: 2025-07-31

## 2025-07-31 RX ORDER — ARIPIPRAZOLE 5 MG/1
TABLET ORAL
Qty: 30 TABLET | Refills: 11 | Status: SHIPPED | OUTPATIENT
Start: 2025-07-31

## 2025-07-31 RX ORDER — ALPRAZOLAM 1 MG/1
TABLET ORAL
Qty: 60 TABLET | Refills: 2 | Status: SHIPPED | OUTPATIENT
Start: 2025-07-31

## 2025-07-31 RX ADMIN — DEXAMETHASONE SODIUM PHOSPHATE 10 MG: 10 INJECTION INTRAMUSCULAR; INTRAVENOUS at 09:07

## 2025-07-31 NOTE — LETTER
July 31, 2025      [facility]  [address] [place] [address]  Phone: [phone]       Patient: Malia Schroeder   YOB: 1980  Date of Visit: 07/31/2025    To Whom It May Concern:    Judie Schroeder  was at [facility] on 07/31/2025. The patient may return to work/school on 08/01/2025 with no restrictions. If you have any questions or concerns, or if I can be of further assistance, please do not hesitate to contact me.    Sincerely,    Ashlyn Lopez MA

## 2025-07-31 NOTE — PROGRESS NOTES
Subjective:       Patient ID: Malia Schroeder is a 45 y.o. female.    Chief Complaint: Sinus Problem (X1 week), Nasal Congestion (X1 week), and Cough (X1 week)      History of Present Illness    CHIEF COMPLAINT:  Patient presents today with ear and sinus symptoms.    HISTORY OF PRESENT ILLNESS:  She reports symptoms started approximately one week ago with initial sinus pressure, progressing to nasal congestion more pronounced on the right side. She describes discolored nasal discharge and reports worsening symptoms over the past day. She denies body aches or other systemic symptoms. Over-the-counter medications have not provided significant relief.          Review of Systems  Comprehensive review of systems negative except as stated in HPI    The patient's Health Maintenance was reviewed and the following appears to be due:   There are no preventive care reminders to display for this patient.    Past Medical History:  Past Medical History:   Diagnosis Date    Allergies     Anxiety disorder, unspecified     Chronic sinusitis, unspecified     Depression     GERD (gastroesophageal reflux disease)     Hyperlipidemia     Hypertension     Hypokalemia     Iron deficiency anemia, unspecified     Mild intermittent asthma, uncomplicated     Mild intermittent asthma, uncomplicated     Obstructive sleep apnea     Peripheral edema      Past Surgical History:   Procedure Laterality Date    CHOLECYSTECTOMY      HYSTERECTOMY      PLANTAR FASCIA RELEASE      SINUS SURGERY      TARSAL TUNNEL RELEASE      TONSILLECTOMY       Review of patient's allergies indicates:   Allergen Reactions    Hydrocodone-acetaminophen Hives    Promethazine Anxiety     Medications Ordered Prior to Encounter[1]  Social History[2]  Family History   Problem Relation Name Age of Onset    Hypertension Mother Keyla Schroeder     Dementia Mother Keyla Schroeder     Arthritis Mother Keyla Schroeder     Cancer Father Sami Schroeder         Non hodgekins lymphoma  "      Objective:       /75 (BP Location: Left arm)   Pulse 76   Temp 97.8 °F (36.6 °C) (Oral)   Resp 16   Ht 4' 11" (1.499 m)   Wt 98.4 kg (217 lb)   SpO2 97%   BMI 43.83 kg/m²      Physical Exam  Vitals and nursing note reviewed.   Constitutional:       Appearance: Normal appearance. She is obese.   HENT:      Head: Normocephalic and atraumatic.      Right Ear: Tympanic membrane, ear canal and external ear normal.      Left Ear: Tympanic membrane, ear canal and external ear normal.      Nose: Congestion present.      Right Sinus: Maxillary sinus tenderness present.      Left Sinus: Maxillary sinus tenderness present.      Mouth/Throat:      Mouth: Mucous membranes are moist.      Pharynx: Oropharynx is clear.   Eyes:      Extraocular Movements: Extraocular movements intact.      Conjunctiva/sclera: Conjunctivae normal.      Pupils: Pupils are equal, round, and reactive to light.   Cardiovascular:      Rate and Rhythm: Normal rate and regular rhythm.      Heart sounds: Normal heart sounds.   Pulmonary:      Effort: Pulmonary effort is normal.      Breath sounds: Normal breath sounds.   Musculoskeletal:         General: Normal range of motion.      Cervical back: Normal range of motion and neck supple.   Skin:     General: Skin is warm and dry.   Neurological:      General: No focal deficit present.      Mental Status: She is alert and oriented to person, place, and time.   Psychiatric:         Mood and Affect: Mood normal.         Behavior: Behavior normal.         Thought Content: Thought content normal.         Judgment: Judgment normal.         Labs  Office Visit on 07/31/2025   Component Date Value Ref Range Status    SARS Coronavirus 2 Antigen 07/31/2025 Negative  Negative, Presumptive Negative Final     Acceptable 07/31/2025 Yes   Final         Assessment and Plan     Assessment & Plan    J01.00 Acute non-recurrent maxillary sinusitis    IMPRESSION:  - Assessed for sinus infection " based on reported symptoms and negative COVID test.    ACUTE NON-RECURRENT MAXILLARY SINUSITIS:  - Started Cefdinir (antibiotic) 2 times daily for 10 days.  - Administered Decadron (steroid) injection in office.    LIFESTYLE CHANGES:  - Administered COVID test in office (result: negative).  - Patient to return to work tomorrow.           1. Acute non-recurrent maxillary sinusitis  -     dexAMETHasone injection 10 mg  -     cefdinir (OMNICEF) 300 MG capsule; Take 1 capsule (300 mg total) by mouth 2 (two) times daily. for 10 days  Dispense: 20 capsule; Refill: 0  -     SARS Coronavirus 2 Antigen, POCT Manual Read           Follow up if symptoms worsen or fail to improve.     This note was generated with the assistance of ambient listening technology. Verbal consent was obtained by the patient and accompanying visitor(s) for the recording of patient appointment to facilitate this note. I attest to having reviewed and edited the generated note for accuracy, though some syntax or spelling errors may persist. Please contact the author of this note for any clarification.            [1]   Current Outpatient Medications on File Prior to Visit   Medication Sig Dispense Refill    busPIRone (BUSPAR) 30 MG Tab TAKE ONE (1) TABLET (30 MG TOTAL) BY MOUTH THREE (3) TIMES DAILY. ( INCREASED DOSE ) 90 tablet 11    diclofenac (VOLTAREN) 75 MG EC tablet Take 1 tablet (75 mg total) by mouth 2 (two) times daily. Take with food 60 tablet 1    famotidine (PEPCID) 20 MG tablet Take 1 tablet (20 mg total) by mouth every evening. 90 tablet 3    lansoprazole (PREVACID) 30 MG capsule Take 1 capsule (30 mg total) by mouth once daily. 90 capsule 3    levocetirizine (XYZAL) 5 MG tablet Take 1 tablet (5 mg total) by mouth every evening. 90 tablet 3    meclizine (ANTIVERT) 25 mg tablet Take 1 tablet (25 mg total) by mouth 3 (three) times daily as needed for Dizziness. 90 tablet 3    montelukast (SINGULAIR) 10 mg tablet TAKE ONE (1) TABLET (10 MG  TOTAL) BY MOUTH EVERY EVENING. 30 tablet 11    NIFEdipine (PROCARDIA-XL) 60 MG (OSM) 24 hr tablet TAKE ONE (1) TABLET (60 MG TOTAL) BY MOUTH ONCE DAILY FOR BLOOD PRESSURE 30 tablet 11    pantoprazole (PROTONIX) 40 MG tablet TAKE ONE (1) TABLET BY MOUTH TWICE DAILY FOR ACID REFLUX. 60 tablet 5    potassium chloride (MICRO-K) 10 MEQ CpSR TAKE TWO (2) CAPSULES (20 MEQ TOTAL) BY MOUTH ONCE DAILY. 60 capsule 11    pravastatin (PRAVACHOL) 40 MG tablet TAKE ONE (1) TABLET (40 MG TOTAL) BY MOUTH ONCE DAILY. 30 tablet 11    pyrilamine-chlophedianoL (NINJACOF) 12.5-12.5 mg/5 mL Liqd Take 10 mLs by mouth every 8 (eight) hours as needed (cough/congestion). 200 mL 0    SEMAGLUTIDE, WEIGHT LOSS, SUBQ Inject 1.7 mg into the skin every 7 days.      spironolactone (ALDACTONE) 50 MG tablet TAKE ONE (1) TABLET BY MOUTH ONCE DAILY. 30 tablet 11    vilazodone (VIIBRYD) 40 mg Tab tablet TAKE ONE (1) TABLET (40 MG TOTAL) BY MOUTH ONCE DAILY FOR DEPRESSION/MOOD 30 tablet 11    [DISCONTINUED] ALPRAZolam (XANAX) 1 MG tablet TAKE ONE (1) TABLET BY MOUTH TWICE A DAY FOR ANXIETY 60 tablet 2    [DISCONTINUED] ARIPiprazole (ABILIFY) 5 MG Tab TAKE ONE (1) TABLET BY MOUTH ONCE DAILY. 30 tablet 2     No current facility-administered medications on file prior to visit.   [2]   Social History  Socioeconomic History    Marital status: Single   Tobacco Use    Smoking status: Some Days     Current packs/day: 0.00     Types: Cigarettes    Smokeless tobacco: Never   Substance and Sexual Activity    Alcohol use: Not Currently     Comment: socially    Drug use: Never    Sexual activity: Not Currently     Social Drivers of Health     Financial Resource Strain: Low Risk  (3/2/2025)    Overall Financial Resource Strain (CARDIA)     Difficulty of Paying Living Expenses: Not very hard   Food Insecurity: No Food Insecurity (3/2/2025)    Hunger Vital Sign     Worried About Running Out of Food in the Last Year: Never true     Ran Out of Food in the Last Year: Never  true   Transportation Needs: No Transportation Needs (3/2/2025)    PRAPARE - Transportation     Lack of Transportation (Medical): No     Lack of Transportation (Non-Medical): No   Physical Activity: Sufficiently Active (3/2/2025)    Exercise Vital Sign     Days of Exercise per Week: 5 days     Minutes of Exercise per Session: 70 min   Stress: No Stress Concern Present (3/2/2025)    [employer] [place] [employer] - Occupational Stress Questionnaire     Feeling of Stress : Not at all   Housing Stability: Low Risk  (3/2/2025)    Housing Stability Vital Sign     Unable to Pay for Housing in the Last Year: No     Number of Times Moved in the Last Year: 0     Homeless in the Last Year: No